# Patient Record
Sex: FEMALE | Race: WHITE | NOT HISPANIC OR LATINO | Employment: FULL TIME | URBAN - METROPOLITAN AREA
[De-identification: names, ages, dates, MRNs, and addresses within clinical notes are randomized per-mention and may not be internally consistent; named-entity substitution may affect disease eponyms.]

---

## 2023-03-14 ENCOUNTER — LAB (OUTPATIENT)
Dept: LAB | Facility: LAB | Age: 48
End: 2023-03-14
Payer: COMMERCIAL

## 2023-03-14 ENCOUNTER — TELEMEDICINE (OUTPATIENT)
Dept: PRIMARY CARE | Facility: CLINIC | Age: 48
End: 2023-03-14
Payer: COMMERCIAL

## 2023-03-14 DIAGNOSIS — J06.9 ACUTE URI: ICD-10-CM

## 2023-03-14 DIAGNOSIS — R05.1 ACUTE COUGH: ICD-10-CM

## 2023-03-14 DIAGNOSIS — R52 BODY ACHES: ICD-10-CM

## 2023-03-14 DIAGNOSIS — J06.9 ACUTE URI: Primary | ICD-10-CM

## 2023-03-14 PROBLEM — G43.909 MIGRAINES: Status: ACTIVE | Noted: 2023-03-14

## 2023-03-14 PROBLEM — M17.12 ARTHRITIS OF KNEE, LEFT: Status: ACTIVE | Noted: 2023-03-14

## 2023-03-14 PROBLEM — M54.9 BACK PAIN: Status: ACTIVE | Noted: 2023-03-14

## 2023-03-14 PROBLEM — M25.562 CHRONIC PAIN OF LEFT KNEE: Status: ACTIVE | Noted: 2023-03-14

## 2023-03-14 PROBLEM — R09.81 NASAL CONGESTION: Status: ACTIVE | Noted: 2023-03-14

## 2023-03-14 PROBLEM — G89.29 CHRONIC PAIN OF LEFT KNEE: Status: ACTIVE | Noted: 2023-03-14

## 2023-03-14 PROBLEM — N90.7 LABIAL CYST: Status: ACTIVE | Noted: 2023-03-14

## 2023-03-14 PROBLEM — E55.9 VITAMIN D DEFICIENCY: Status: ACTIVE | Noted: 2023-03-14

## 2023-03-14 PROBLEM — G47.00 INSOMNIA: Status: ACTIVE | Noted: 2023-03-14

## 2023-03-14 PROBLEM — G35 MULTIPLE SCLEROSIS (MULTI): Status: ACTIVE | Noted: 2023-03-14

## 2023-03-14 PROBLEM — M25.511 RIGHT SHOULDER PAIN: Status: ACTIVE | Noted: 2023-03-14

## 2023-03-14 PROBLEM — M25.562 LEFT KNEE PAIN: Status: ACTIVE | Noted: 2023-03-14

## 2023-03-14 PROBLEM — M75.41 IMPINGEMENT SYNDROME OF RIGHT SHOULDER: Status: ACTIVE | Noted: 2023-03-14

## 2023-03-14 PROBLEM — I10 BENIGN ESSENTIAL HYPERTENSION: Status: ACTIVE | Noted: 2023-03-14

## 2023-03-14 PROBLEM — B37.31 CANDIDA VAGINITIS: Status: ACTIVE | Noted: 2023-03-14

## 2023-03-14 PROBLEM — R10.9 ABDOMINAL PAIN: Status: ACTIVE | Noted: 2023-03-14

## 2023-03-14 PROBLEM — M22.40 CHONDROMALACIA OF PATELLA: Status: ACTIVE | Noted: 2023-03-14

## 2023-03-14 PROBLEM — N92.6 IRREGULAR MENSES: Status: ACTIVE | Noted: 2023-03-14

## 2023-03-14 PROBLEM — M25.519 AC JOINT PAIN: Status: ACTIVE | Noted: 2023-03-14

## 2023-03-14 PROCEDURE — U0005 INFEC AGEN DETEC AMPLI PROBE: HCPCS

## 2023-03-14 PROCEDURE — 99214 OFFICE O/P EST MOD 30 MIN: CPT | Performed by: STUDENT IN AN ORGANIZED HEALTH CARE EDUCATION/TRAINING PROGRAM

## 2023-03-14 PROCEDURE — 87651 STREP A DNA AMP PROBE: CPT

## 2023-03-14 PROCEDURE — 87636 SARSCOV2 & INF A&B AMP PRB: CPT

## 2023-03-14 RX ORDER — BENZONATATE 200 MG/1
200 CAPSULE ORAL 3 TIMES DAILY PRN
Qty: 42 CAPSULE | Refills: 0 | Status: SHIPPED | OUTPATIENT
Start: 2023-03-14 | End: 2023-04-13

## 2023-03-14 RX ORDER — LISINOPRIL 20 MG/1
20 TABLET ORAL DAILY
COMMUNITY
End: 2023-06-08 | Stop reason: SDUPTHER

## 2023-03-14 RX ORDER — ERGOCALCIFEROL 1.25 MG/1
CAPSULE ORAL
COMMUNITY
End: 2023-06-08 | Stop reason: SDUPTHER

## 2023-03-14 RX ORDER — DOXYCYCLINE 100 MG/1
100 CAPSULE ORAL 2 TIMES DAILY
Qty: 14 CAPSULE | Refills: 0 | Status: SHIPPED | OUTPATIENT
Start: 2023-03-14 | End: 2023-03-21

## 2023-03-14 RX ORDER — TRAZODONE HYDROCHLORIDE 150 MG/1
150 TABLET ORAL NIGHTLY
COMMUNITY
End: 2023-06-08 | Stop reason: SDUPTHER

## 2023-03-14 NOTE — PROGRESS NOTES
Subjective   Patient ID: Becky Mendiola is a 47 y.o. female who presents for URI.    HPI  Over the past 3 days she has been noticing an upper respiratory infection  Stated symptoms started as ear congestion, postnasal drip, body aches, coughing, runny nose, insomnia, and the overall feeling of being acutely ill    She states that her body aches are slightly worsening due to all the coughing that she is experiencing    She did test negative for COVID-19 at home and denies any sick contacts or any traveling    Asking to be further evaluated/treated as this occurs a few times a year for her in the past  Most recent was in the fall and she was treated appropriately with doxycycline    Review of Systems  All pertinent positive symptoms are included in the history of present illness.    All other systems have been reviewed and are negative and noncontributory to this patient's current ailments.    Objective   There were no vitals taken for this visit.    Physical Exam  CONSTITUTIONAL - well nourished, well developed, looks like stated age, in no acute distress, appears slightly fatigued and ill, sounds congested  SKIN - normal skin color and pigmentation, normal skin turgor without rash, lesions, or nodules visualized  HEAD - no trauma, normocephalic  EYES - normal external exam  CHEST -no distressed breathing, good effort, coughing occasionally during the examination  EXTREMITIES - no edema, no deformities  NEUROLOGICAL - normal balance, normal motor, no ataxia  PSYCHIATRIC - alert, pleasant and cordial, age-appropriate    Assessment/Plan   1.  Acute URI/cough/body aches    To be fully thorough I did order COVID-19, influenza A/B, and group A strep  Please come to the office to be swabbed at your earliest mutual convenience    Lastly, since this is very similar to your last illness, I did provide doxycycline be taken twice daily for the next 7 days alongside Tessalon Perles to help with your cough  If your cough and other  symptoms continue, we will discuss adding on a steroid pack    Otherwise, we will wait for the results and make recommendations accordingly based on the swabs that we will do today    At any point if you have worsening signs/symptoms or any difficulty breathing, you need to contact the office immediately and/or go to the nearest emergency room to be acutely evaluated

## 2023-03-15 ENCOUNTER — TELEPHONE (OUTPATIENT)
Dept: PRIMARY CARE | Facility: CLINIC | Age: 48
End: 2023-03-15
Payer: COMMERCIAL

## 2023-03-15 DIAGNOSIS — J06.9 ACUTE URI: Primary | ICD-10-CM

## 2023-03-15 LAB
FLU A RESULT: NOT DETECTED
FLU B RESULT: NOT DETECTED
GROUP A STREP, PCR: NOT DETECTED
SARS-COV-2 RESULT: NOT DETECTED

## 2023-03-15 RX ORDER — ALBUTEROL SULFATE 90 UG/1
2 AEROSOL, METERED RESPIRATORY (INHALATION) EVERY 4 HOURS PRN
Qty: 8.5 G | Refills: 1 | Status: SHIPPED | OUTPATIENT
Start: 2023-03-15 | End: 2023-12-08 | Stop reason: SDUPTHER

## 2023-03-15 NOTE — PROGRESS NOTES
Sent into the pharmacy and albuterol inhaler for the patient's acute URI    This was sent via another message

## 2023-03-15 NOTE — TELEPHONE ENCOUNTER
----- Message from Kasi Childers,  sent at 3/15/2023 11:33 AM EDT -----  Group A strep negative alongside influenza A/B as well as COVID-19    I would recommend that she continues to take the doxycycline that was prescribed    Please follow-up appropriately if this continues to be an issue

## 2023-03-15 NOTE — RESULT ENCOUNTER NOTE
Group A strep negative alongside influenza A/B as well as COVID-19    I would recommend that she continues to take the doxycycline that was prescribed    Please follow-up appropriately if this continues to be an issue

## 2023-03-17 ENCOUNTER — PATIENT MESSAGE (OUTPATIENT)
Dept: PRIMARY CARE | Facility: CLINIC | Age: 48
End: 2023-03-17
Payer: COMMERCIAL

## 2023-03-17 DIAGNOSIS — J06.9 ACUTE URI: Primary | ICD-10-CM

## 2023-03-17 RX ORDER — METHYLPREDNISOLONE 4 MG/1
TABLET ORAL
Qty: 21 TABLET | Refills: 0 | Status: SHIPPED | OUTPATIENT
Start: 2023-03-17 | End: 2023-03-24

## 2023-03-20 ENCOUNTER — TELEPHONE (OUTPATIENT)
Dept: PRIMARY CARE | Facility: CLINIC | Age: 48
End: 2023-03-20
Payer: COMMERCIAL

## 2023-03-20 DIAGNOSIS — J06.9 ACUTE URI: Primary | ICD-10-CM

## 2023-03-20 DIAGNOSIS — R05.1 ACUTE COUGH: ICD-10-CM

## 2023-03-20 DIAGNOSIS — R52 BODY ACHES: ICD-10-CM

## 2023-03-20 RX ORDER — AZITHROMYCIN 250 MG/1
TABLET, FILM COATED ORAL
Qty: 6 TABLET | Refills: 0 | Status: SHIPPED | OUTPATIENT
Start: 2023-03-20 | End: 2023-03-25

## 2023-03-20 RX ORDER — CODEINE PHOSPHATE AND GUAIFENESIN 10; 100 MG/5ML; MG/5ML
5 SOLUTION ORAL 2 TIMES DAILY
Qty: 50 ML | Refills: 0 | Status: SHIPPED | OUTPATIENT
Start: 2023-03-20 | End: 2023-06-08 | Stop reason: ALTCHOICE

## 2023-03-20 NOTE — TELEPHONE ENCOUNTER
Pt feels antibiotics aren't helping her cough. Pt is still coughing constantly and feels it has moved into her chest as she is having some wheezing. She is using the inhaler but feels she needs something else to help suppress these symptoms. Also still has sinus congestion.

## 2023-03-20 NOTE — TELEPHONE ENCOUNTER
Regarding: FW: Not feeling better  Contact: [phone]  Unfortunately, I am not too sure where else to go    There is another medication but it is much stronger and I never prescribe it    I would just recommend then that she just changes to the azithromycin that was prescribed    Also, did she finish all of her steroids?  ----- Message -----  From: Susan Ravi MA  Sent: 3/20/2023   4:12 PM EDT  To: Kasi Childers DO  Subject: FW: Not feeling better                           Spoke w/[facility], they do not carry Codeine products at all and spoke w/[facility] who is also no longer carrying this. Is there an alternative we can send for her?  ----- Message -----  From: Kasi Childers DO  Sent: 3/20/2023   3:59 PM EDT  To: Susan Ravi MA  Subject: FW: Not feeling better                           Please call the pharmacy to see if this is truly the case    If it is, please let me know    If anything, we can easily try to send to [facility] here in [place] if we have to    Please let me know    Thank you  ----- Message -----  From: Ssuan Ravi MA  Sent: 3/20/2023   2:51 PM EDT  To: Kasi Childers DO  Subject: FW: Not feeling better                             ----- Message -----  From: Becky Mendiola  Sent: 3/20/2023   2:47 PM EDT  To: #  Subject: Not feeling better                               Juan Carlos Davis, I just talked to [facility] and they cancelled the cough medicine with Codeine that you sent over. They said they don't fill those prescriptions any longer because people were sending in fake scripts? Do you know of any other pharmacy's that would take that, or something similar that can be prescribed? I plan on going to the [facility] in [place] in the morning to get my x-ray. [facility] [place]?

## 2023-03-22 ENCOUNTER — TELEPHONE (OUTPATIENT)
Dept: PRIMARY CARE | Facility: CLINIC | Age: 48
End: 2023-03-22
Payer: COMMERCIAL

## 2023-03-22 NOTE — TELEPHONE ENCOUNTER
----- Message from Kasi Childers DO sent at 3/22/2023 11:26 AM EDT -----  Chest x-ray was well within normal limits without any acute findings    No pneumonia is noted    I recommend that she continue the current medication regiment that was prescribed

## 2023-03-22 NOTE — RESULT ENCOUNTER NOTE
Chest x-ray was well within normal limits without any acute findings    No pneumonia is noted    I recommend that she continue the current medication regiment that was prescribed

## 2023-06-06 ASSESSMENT — PROMIS GLOBAL HEALTH SCALE
CARRYOUT_SOCIAL_ACTIVITIES: VERY GOOD
RATE_SOCIAL_SATISFACTION: VERY GOOD
RATE_PHYSICAL_HEALTH: VERY GOOD
RATE_AVERAGE_FATIGUE: MODERATE
RATE_AVERAGE_PAIN: 5
EMOTIONAL_PROBLEMS: RARELY
RATE_QUALITY_OF_LIFE: VERY GOOD
CARRYOUT_PHYSICAL_ACTIVITIES: COMPLETELY
RATE_MENTAL_HEALTH: EXCELLENT
RATE_GENERAL_HEALTH: VERY GOOD

## 2023-06-08 ENCOUNTER — OFFICE VISIT (OUTPATIENT)
Dept: PRIMARY CARE | Facility: CLINIC | Age: 48
End: 2023-06-08
Payer: COMMERCIAL

## 2023-06-08 VITALS
SYSTOLIC BLOOD PRESSURE: 122 MMHG | WEIGHT: 204 LBS | HEIGHT: 67 IN | BODY MASS INDEX: 32.02 KG/M2 | DIASTOLIC BLOOD PRESSURE: 80 MMHG | HEART RATE: 87 BPM

## 2023-06-08 DIAGNOSIS — E55.9 VITAMIN D DEFICIENCY: ICD-10-CM

## 2023-06-08 DIAGNOSIS — I10 PRIMARY HYPERTENSION: ICD-10-CM

## 2023-06-08 DIAGNOSIS — G43.109 MIGRAINE WITH AURA AND WITHOUT STATUS MIGRAINOSUS, NOT INTRACTABLE: ICD-10-CM

## 2023-06-08 DIAGNOSIS — G47.00 INSOMNIA, UNSPECIFIED TYPE: ICD-10-CM

## 2023-06-08 DIAGNOSIS — Z00.00 HEALTH MAINTENANCE EXAMINATION: Primary | ICD-10-CM

## 2023-06-08 DIAGNOSIS — G35 MULTIPLE SCLEROSIS (MULTI): ICD-10-CM

## 2023-06-08 DIAGNOSIS — Z12.31 ENCOUNTER FOR SCREENING MAMMOGRAM FOR MALIGNANT NEOPLASM OF BREAST: ICD-10-CM

## 2023-06-08 LAB
POC APPEARANCE, URINE: CLEAR
POC BILIRUBIN, URINE: NEGATIVE
POC BLOOD, URINE: NEGATIVE
POC COLOR, URINE: NORMAL
POC GLUCOSE, URINE: NEGATIVE MG/DL
POC KETONES, URINE: NEGATIVE MG/DL
POC LEUKOCYTES, URINE: NEGATIVE
POC NITRITE,URINE: NEGATIVE
POC PH, URINE: 6 PH
POC PROTEIN, URINE: NEGATIVE MG/DL
POC SPECIFIC GRAVITY, URINE: 1.02
POC UROBILINOGEN, URINE: 0.2 EU/DL

## 2023-06-08 PROCEDURE — 3074F SYST BP LT 130 MM HG: CPT | Performed by: STUDENT IN AN ORGANIZED HEALTH CARE EDUCATION/TRAINING PROGRAM

## 2023-06-08 PROCEDURE — 3079F DIAST BP 80-89 MM HG: CPT | Performed by: STUDENT IN AN ORGANIZED HEALTH CARE EDUCATION/TRAINING PROGRAM

## 2023-06-08 PROCEDURE — 99213 OFFICE O/P EST LOW 20 MIN: CPT | Performed by: STUDENT IN AN ORGANIZED HEALTH CARE EDUCATION/TRAINING PROGRAM

## 2023-06-08 PROCEDURE — 81002 URINALYSIS NONAUTO W/O SCOPE: CPT | Performed by: STUDENT IN AN ORGANIZED HEALTH CARE EDUCATION/TRAINING PROGRAM

## 2023-06-08 PROCEDURE — 1036F TOBACCO NON-USER: CPT | Performed by: STUDENT IN AN ORGANIZED HEALTH CARE EDUCATION/TRAINING PROGRAM

## 2023-06-08 PROCEDURE — 99396 PREV VISIT EST AGE 40-64: CPT | Performed by: STUDENT IN AN ORGANIZED HEALTH CARE EDUCATION/TRAINING PROGRAM

## 2023-06-08 RX ORDER — LISINOPRIL 20 MG/1
20 TABLET ORAL DAILY
Qty: 90 TABLET | Refills: 1 | Status: SHIPPED | OUTPATIENT
Start: 2023-06-08 | End: 2023-11-28

## 2023-06-08 RX ORDER — ERGOCALCIFEROL 1.25 MG/1
1 CAPSULE ORAL
Qty: 24 CAPSULE | Refills: 0 | Status: SHIPPED | OUTPATIENT
Start: 2023-06-08 | End: 2023-10-30

## 2023-06-08 RX ORDER — TRAZODONE HYDROCHLORIDE 150 MG/1
150 TABLET ORAL NIGHTLY
Qty: 90 TABLET | Refills: 1 | Status: SHIPPED | OUTPATIENT
Start: 2023-06-08 | End: 2023-11-28

## 2023-06-08 NOTE — PROGRESS NOTES
Subjective   Patient ID: Becky Mendiola is a 47 y.o. female who presents for Annual Exam.  Today she is accompanied by alone.     HPI    1. Health maintenance  Overall she feels well and does not have major complaints other than what is discussed below  Immunizations: up to date.  Breast Cancer Screening: Mammogram performed in March 2022, Birads 1.  Has not seen her OB/GYN since 2015, stated that she will do so in the near future  Cervical Cancer ScreningL Pap/HPV performed in 2015 negative.  Colon Cancer Screening: Cologuard performed in March 2022, negative.  Denies any chest pain, palpitations, shortness of breath, cough, nausea, vomiting, diarrhea, or any other acute signs/symptoms  Diet: well balanced  Exercise: Trying to increase.     2. Benign essential hypertension  Continues to take lisinopril 20 mg daily as indicated in the chart  Blood pressure today 122/80.  Denies any cardiopulmonary symptoms  Requesting refills     3. Insomnia  Feels well with the trazodone 150 mg at bedtime.  She is well rested in the morning. Desires to continue at current dose.  Requesting refills to be sent to her local pharmacy     4. Vitamin D deficiency  Continues to take vitamin D 50,000 International Units weekly.  Asking to get this rechecked    5. Migraines  In last two months, she had her migraines return that she had previously.  She will develop some eye pain, then her migraine develops later in the day.  Admits to nausea and vomiting, sensitivity to sound and light.  Her migraines last for 2-3 days.  Denies numbness or tingling, dysphagia, weakness, loss of eye sight.  Previously tried triptans, bet always felt sick, desires to try something else.    Current Outpatient Medications on File Prior to Visit   Medication Sig Dispense Refill    albuterol (ProAir HFA) 90 mcg/actuation inhaler Inhale 2 puffs every 4 hours if needed for wheezing or shortness of breath. 8.5 g 1    [DISCONTINUED] codeine-guaifenesin (guaiFENesin  "AC)  mg/5 mL syrup Take 5 mL by mouth in the morning and 5 mL before bedtime. As needed for cough. 50 mL 0    [DISCONTINUED] ergocalciferol (Vitamin D-2) 1.25 MG (44675 UT) capsule take 1 capsule by mouth one time per week      [DISCONTINUED] lisinopril 20 mg tablet Take 1 tablet (20 mg) by mouth once daily.      [DISCONTINUED] traZODone (Desyrel) 150 mg tablet Take 1 tablet (150 mg) by mouth once daily at bedtime.       No current facility-administered medications on file prior to visit.        Allergies   Allergen Reactions    Amoxicillin Unknown    Sulfa (Sulfonamide Antibiotics) Unknown       Immunization History   Administered Date(s) Administered    Influenza, seasonal, injectable 10/12/2020    Tdap 08/01/2017, 01/10/2023         Review of Systems  All pertinent positive symptoms are included in the history of present illness.  All other systems have been reviewed and are negative and noncontributory to this patient's current ailments.     Objective   /80 (BP Location: Left arm, Patient Position: Sitting, BP Cuff Size: Adult)   Pulse 87   Ht 1.702 m (5' 7\")   Wt 92.5 kg (204 lb)   BMI 31.95 kg/m²   BSA: 2.09 meters squared  No visits with results within 1 Month(s) from this visit.   Latest known visit with results is:   Lab on 03/14/2023   Component Date Value Ref Range Status    Flu A Result 03/14/2023 NOT DETECTED  Not Detected Final     Respiratory virus testing is performed routinely by PCR    for Influenza A/B and RSV. If Influenza and RSV PCR are    negative, testing for parainfluenza 1,2,3 viruses and    adenovirus is routinely performed for oncology inpatients    and intensive care unit patients at [facility] and is available   on request on other patients by calling Laboratory Client   Services at [phone].   Not Detected results do not preclude Influenza A/B or RSV   infections since the adequacy of sample collection or low   viral burden may impact the clinical sensitivity of this   " test method.    Flu B Result 03/14/2023 NOT DETECTED  Not Detected Final     Respiratory virus testing is performed routinely by PCR    for Influenza A/B and RSV. If Influenza and RSV PCR are    negative, testing for parainfluenza 1,2,3 viruses and    adenovirus is routinely performed for oncology inpatients    and intensive care unit patients at [facility] and is available   on request on other patients by calling Laboratory Client   Services at [phone]   Not Detected results do not preclude Influenza A/B or RSV   infections since the adequacy of sample collection or low   viral burden may impact the clinical sensitivity of this   test method.    SARS-COV-2 RESULT 03/14/2023 NOT DETECTED  Not Detected Final    Comment: .  This assay is designed to detect the ORF1a/b and E genes of SARS-CoV-2 via   nucleic acid amplification. A Not Detected result does not preclude   2019-nCoV infection since the adequacy of sample collection and/or low viral   burden may result in presence of viral nucleic acids below the clinical   sensitivity of this test method.     Fact sheet for providers: https://www.fda.gov/media/739741/download   Fact sheet for patients: https://www.fda.gov/media/157425/download     This test has received FDA Emergency Use Authorization (EUA) and has been   verified for use by [facility] ([facility]).   This test is only authorized for the duration of time that circumstances   exist to justify the authorization of the emergency use of in vitro   diagnostic tests for the detection of SARS-CoV-2 virus and/or diagnosis of   COVID-19 infection under section 564(b)(1) of the Act, 21 U.S.C.   360bbb-3(b)(1), unless the authorization is terminated or revoked                            sooner.    [facility] is certified under CLIA-88 as   qualified to perform high complexity testing. Testing is performed in the   [facility] laboratories located at [id number]  [place] [address].    Group A Strep PCR 03/14/2023 NOT DETECTED  Not Detected Final     This test and its performance have been Validated by [facility]    Laboratory  using analyte specific reagents (ASR). It has   not been cleared or approved by the U.S. Food and Drug   Administration. The FDA has determined that such clearance   or approval is not necessary.       Physical Exam  CONSTITUTIONAL - well nourished, well developed, looks like stated age, in no acute distress, not ill-appearing, and not tired appearing  SKIN - normal skin color and pigmentation, normal skin turgor without rash, lesions, or nodules visualized  HEAD - no trauma, normocephalic  EYES - pupils are equal and reactive to light, extraocular muscles are intact, and normal external exam  ENT - TM's intact, no injection, no signs of infection, uvula midline, normal tongue movement and throat normal, no exudate, nasal passage without discharge and patent  NECK - supple without rigidity, no neck mass was observed, no thyromegaly or thyroid nodules  CHEST - clear to auscultation, no wheezing, no crackles and no rales, good effort  CARDIAC - regular rate and regular rhythm, no skipped beats, no murmur  ABDOMEN - no organomegaly, soft, nontender, nondistended, normal bowel sounds, no guarding/rebound/rigidity, negative McBurney sign and negative Carroll sign  NEUROLOGICAL - normal gait, normal balance, normal motor, no ataxia, DTRs equal and symmetrical; alert, oriented and no focal signs  PSYCHIATRIC - alert, pleasant and cordial, age-appropriate  IMMUNOLOGIC - no cervical lymphadenopathy     Assessment/Plan   1. Health maintenance  Complete history and physical examination was performed  EKG declined per patient.  We will notify of test results once available and make treatment recommendations accordingly  Order placed for mammogram with tomosynthesis.  Recommend to establish with a gynecologist to update your cervical cancer screening.      2. Benign essential hypertension  Stable, continue lisinopril 20 mg daily.  Blood pressure goal should be below 130/80, ideally 120/80  Please check your blood pressure at home and keep a log.  Please bring this log with you to your next appointment     3. Insomnia  Stable, continue trazodone 150 mg at night.     4. Vitamin D deficiency  We will notify you of the results and make recommendations accordingly.    5. Migraines  Samples provided for Demond and Nurtec for a trial.  If you find one works better for you over the other we can send in a prescription for the medication.    6.  MS  Has a history of MS and used to see a neurologist 5+ years ago  Currently feels fine and asymptomatic  We will continue monitoring closely    If you have any concerns or questions please call the office.    Follow up in 6 months for medication check.

## 2023-10-29 DIAGNOSIS — E55.9 VITAMIN D DEFICIENCY: ICD-10-CM

## 2023-10-30 RX ORDER — ERGOCALCIFEROL 1.25 MG/1
1 CAPSULE ORAL
Qty: 24 CAPSULE | Refills: 0 | Status: SHIPPED | OUTPATIENT
Start: 2023-10-30 | End: 2024-04-22

## 2023-11-01 ENCOUNTER — OFFICE VISIT (OUTPATIENT)
Dept: PRIMARY CARE | Facility: CLINIC | Age: 48
End: 2023-11-01
Payer: COMMERCIAL

## 2023-11-01 VITALS
DIASTOLIC BLOOD PRESSURE: 80 MMHG | BODY MASS INDEX: 32.3 KG/M2 | SYSTOLIC BLOOD PRESSURE: 122 MMHG | WEIGHT: 205.8 LBS | HEART RATE: 82 BPM | HEIGHT: 67 IN

## 2023-11-01 DIAGNOSIS — G89.29 CHRONIC PAIN OF LEFT KNEE: Primary | ICD-10-CM

## 2023-11-01 DIAGNOSIS — G43.109 MIGRAINE WITH AURA AND WITHOUT STATUS MIGRAINOSUS, NOT INTRACTABLE: ICD-10-CM

## 2023-11-01 DIAGNOSIS — G35 MULTIPLE SCLEROSIS (MULTI): ICD-10-CM

## 2023-11-01 DIAGNOSIS — M25.562 CHRONIC PAIN OF LEFT KNEE: Primary | ICD-10-CM

## 2023-11-01 PROCEDURE — 3074F SYST BP LT 130 MM HG: CPT | Performed by: STUDENT IN AN ORGANIZED HEALTH CARE EDUCATION/TRAINING PROGRAM

## 2023-11-01 PROCEDURE — 3079F DIAST BP 80-89 MM HG: CPT | Performed by: STUDENT IN AN ORGANIZED HEALTH CARE EDUCATION/TRAINING PROGRAM

## 2023-11-01 PROCEDURE — 99214 OFFICE O/P EST MOD 30 MIN: CPT | Performed by: STUDENT IN AN ORGANIZED HEALTH CARE EDUCATION/TRAINING PROGRAM

## 2023-11-01 PROCEDURE — 1036F TOBACCO NON-USER: CPT | Performed by: STUDENT IN AN ORGANIZED HEALTH CARE EDUCATION/TRAINING PROGRAM

## 2023-11-01 RX ORDER — NAPROXEN 500 MG/1
500 TABLET ORAL 2 TIMES DAILY PRN
Qty: 60 TABLET | Refills: 2 | Status: SHIPPED | OUTPATIENT
Start: 2023-11-01 | End: 2024-04-23

## 2023-11-01 ASSESSMENT — PATIENT HEALTH QUESTIONNAIRE - PHQ9
SUM OF ALL RESPONSES TO PHQ9 QUESTIONS 1 AND 2: 0
2. FEELING DOWN, DEPRESSED OR HOPELESS: NOT AT ALL
1. LITTLE INTEREST OR PLEASURE IN DOING THINGS: NOT AT ALL

## 2023-11-01 NOTE — PROGRESS NOTES
"Subjective   Patient ID: Becky Mendiola is a 48 y.o. female who presents for Left Knee Pain.    HPI   Patient is presenting to the office today for left-sided knee pain as well as to follow-up with her migraines    1.  Left knee pain  Patient has a chronic history of left-sided knee pain as well as arthritis  She even followed up with an orthopedic provider, Dr. Berman, back in 2020  She is status post left knee scope lateral release on 6/8/2020  Recently just traveled to the [facility] and ultimately has had worsening signs/symptoms  Try to do home PT but unfortunately not helping  Feels like this is like a rubber band sometimes is restricting motion  Wish to discuss this further    2.  Migraines  She has been using the Ubrelvy and stated that this works fantastically  Willing and wishing to continue this medication on as-needed basis  Requesting refills    Review of Systems  All pertinent positive symptoms are included in the history of present illness.    All other systems have been reviewed and are negative and noncontributory to this patient's current ailments.  Objective   /80 (BP Location: Left arm, Patient Position: Sitting, BP Cuff Size: Adult)   Pulse 82   Ht 1.702 m (5' 7\")   Wt 93.4 kg (205 lb 12.8 oz)   BMI 32.23 kg/m²     Physical Exam  CONSTITUTIONAL - well nourished, well developed, looks like stated age, in no acute distress, not ill-appearing, and not tired appearing  SKIN - normal skin color and pigmentation, normal skin turgor without rash, lesions, or nodules visualized  HEAD - no trauma, normocephalic  EYES - normal external exam  CHEST -no distressed breathing, good effort  EXTREMITIES - no edema, no deformities, knee examination within normal limits other than noted arthritis upon palpation and some minor discomfort on motion, negative Lachman, negative Yunier  NEUROLOGICAL - normal balance, normal motor, no ataxia  PSYCHIATRIC - alert, pleasant and cordial, " age-appropriate    Assessment/Plan   1.  Left knee pain  We had a long conversation about your signs/symptoms  I did order an MRI to see if we get this covered due to you having another MRI being done in 2019 and also having surgery at that time shortly thereafter  If we cannot get this covered through your insurance, I would then recommend we look for a cash price location such as [facility] or even [facility]  To help with the pain and inflammation I did send naproxen to use on an as-needed basis  Keep me in the loop on how you are doing and updated with this MRI  We will notify the results and make recommendations accordingly when these come through and we may need to get you back to an orthopedic provider    2.  Migraines  Refills of the Ubrelvy were sent to your local pharmacy  Please continue keeping us in the loop on how you are feeling  Stable without any changes

## 2023-11-17 ENCOUNTER — HOSPITAL ENCOUNTER (OUTPATIENT)
Dept: RADIOLOGY | Facility: CLINIC | Age: 48
Discharge: HOME | End: 2023-11-17
Payer: COMMERCIAL

## 2023-11-17 DIAGNOSIS — G89.29 CHRONIC PAIN OF LEFT KNEE: ICD-10-CM

## 2023-11-17 DIAGNOSIS — M25.562 CHRONIC PAIN OF LEFT KNEE: ICD-10-CM

## 2023-11-17 PROCEDURE — 73721 MRI JNT OF LWR EXTRE W/O DYE: CPT | Mod: LT

## 2023-11-17 PROCEDURE — 73721 MRI JNT OF LWR EXTRE W/O DYE: CPT | Mod: LEFT SIDE | Performed by: RADIOLOGY

## 2023-11-17 NOTE — RESULT ENCOUNTER NOTE
MRI of the knee shows severe patellofemoral compartment cartilage loss as well as mild cartilage loss in the tibiofemoral compartments    No meniscal or any ligamental tears are seen    There is also a cyst that has been stable from the prior visit    If this continues to be an issue, the next step would be following up with a orthopedic provider
20

## 2023-11-20 DIAGNOSIS — M25.562 LEFT KNEE PAIN, UNSPECIFIED CHRONICITY: ICD-10-CM

## 2023-11-22 ENCOUNTER — OFFICE VISIT (OUTPATIENT)
Dept: PRIMARY CARE | Facility: CLINIC | Age: 48
End: 2023-11-22
Payer: COMMERCIAL

## 2023-11-22 VITALS — DIASTOLIC BLOOD PRESSURE: 78 MMHG | HEART RATE: 88 BPM | SYSTOLIC BLOOD PRESSURE: 130 MMHG

## 2023-11-22 DIAGNOSIS — R31.9 URINARY TRACT INFECTION WITH HEMATURIA, SITE UNSPECIFIED: Primary | ICD-10-CM

## 2023-11-22 DIAGNOSIS — G47.00 INSOMNIA, UNSPECIFIED TYPE: ICD-10-CM

## 2023-11-22 DIAGNOSIS — T36.95XA ANTIBIOTIC-INDUCED YEAST INFECTION: ICD-10-CM

## 2023-11-22 DIAGNOSIS — I10 PRIMARY HYPERTENSION: ICD-10-CM

## 2023-11-22 DIAGNOSIS — B37.9 ANTIBIOTIC-INDUCED YEAST INFECTION: ICD-10-CM

## 2023-11-22 DIAGNOSIS — N39.0 URINARY TRACT INFECTION WITH HEMATURIA, SITE UNSPECIFIED: Primary | ICD-10-CM

## 2023-11-22 LAB
APPEARANCE UR: ABNORMAL
BILIRUB UR STRIP.AUTO-MCNC: NEGATIVE MG/DL
COLOR UR: YELLOW
GLUCOSE UR STRIP.AUTO-MCNC: NEGATIVE MG/DL
KETONES UR STRIP.AUTO-MCNC: NEGATIVE MG/DL
LEUKOCYTE ESTERASE UR QL STRIP.AUTO: NEGATIVE
NITRITE UR QL STRIP.AUTO: NEGATIVE
PH UR STRIP.AUTO: 5 [PH]
POC APPEARANCE, URINE: CLEAR
POC BILIRUBIN, URINE: NEGATIVE
POC BLOOD, URINE: ABNORMAL
POC COLOR, URINE: ABNORMAL
POC GLUCOSE, URINE: NEGATIVE MG/DL
POC KETONES, URINE: NEGATIVE MG/DL
POC LEUKOCYTES, URINE: NEGATIVE
POC NITRITE,URINE: NEGATIVE
POC PH, URINE: 6 PH
POC PROTEIN, URINE: NEGATIVE MG/DL
POC SPECIFIC GRAVITY, URINE: 1.02
POC UROBILINOGEN, URINE: 0.2 EU/DL
PROT UR STRIP.AUTO-MCNC: NEGATIVE MG/DL
RBC # UR STRIP.AUTO: NEGATIVE /UL
SP GR UR STRIP.AUTO: 1.02
UROBILINOGEN UR STRIP.AUTO-MCNC: <2 MG/DL

## 2023-11-22 PROCEDURE — 1036F TOBACCO NON-USER: CPT | Performed by: STUDENT IN AN ORGANIZED HEALTH CARE EDUCATION/TRAINING PROGRAM

## 2023-11-22 PROCEDURE — 87086 URINE CULTURE/COLONY COUNT: CPT

## 2023-11-22 PROCEDURE — 99213 OFFICE O/P EST LOW 20 MIN: CPT | Performed by: STUDENT IN AN ORGANIZED HEALTH CARE EDUCATION/TRAINING PROGRAM

## 2023-11-22 PROCEDURE — 3075F SYST BP GE 130 - 139MM HG: CPT | Performed by: STUDENT IN AN ORGANIZED HEALTH CARE EDUCATION/TRAINING PROGRAM

## 2023-11-22 PROCEDURE — 81002 URINALYSIS NONAUTO W/O SCOPE: CPT | Performed by: STUDENT IN AN ORGANIZED HEALTH CARE EDUCATION/TRAINING PROGRAM

## 2023-11-22 PROCEDURE — 3078F DIAST BP <80 MM HG: CPT | Performed by: STUDENT IN AN ORGANIZED HEALTH CARE EDUCATION/TRAINING PROGRAM

## 2023-11-22 PROCEDURE — 81003 URINALYSIS AUTO W/O SCOPE: CPT

## 2023-11-22 RX ORDER — FLUCONAZOLE 150 MG/1
TABLET ORAL
Qty: 2 TABLET | Refills: 0 | Status: SHIPPED | OUTPATIENT
Start: 2023-11-22 | End: 2023-12-08 | Stop reason: ALTCHOICE

## 2023-11-22 RX ORDER — NITROFURANTOIN 25; 75 MG/1; MG/1
100 CAPSULE ORAL 2 TIMES DAILY
Qty: 14 CAPSULE | Refills: 0 | Status: SHIPPED | OUTPATIENT
Start: 2023-11-22 | End: 2023-11-29

## 2023-11-22 ASSESSMENT — ENCOUNTER SYMPTOMS
FLANK PAIN: 0
PALPITATIONS: 0
NAUSEA: 0
CHILLS: 0
FREQUENCY: 1
VOMITING: 0
DYSURIA: 1
BACK PAIN: 0
HEMATURIA: 1
SHORTNESS OF BREATH: 0
APNEA: 0
ABDOMINAL PAIN: 0
FEVER: 0

## 2023-11-22 NOTE — PROGRESS NOTES
Subjective   Patient ID: Becky Mendiola is a 48 y.o. female who presents for UTI.    UTI   This is a recurrent problem. The current episode started yesterday. The problem has been gradually worsening. The quality of the pain is described as burning. The pain is moderate. There has been no fever. She is Sexually active. There is No history of pyelonephritis. Associated symptoms include frequency, hematuria and urgency. Pertinent negatives include no chills, discharge, flank pain, nausea or vomiting. She has tried increased fluids for the symptoms. The treatment provided no relief. Her past medical history is significant for recurrent UTIs. There is no history of kidney stones or a urological procedure.      Patient states she usually gets UTIs twice a year, last UTI was in May 2023 where she was treated with Macrobid.  Also states that patient is prone to yeast infections with any antibiotic use.    Review of Systems   Constitutional:  Negative for chills and fever.   Respiratory:  Negative for apnea and shortness of breath.    Cardiovascular:  Negative for chest pain and palpitations.   Gastrointestinal:  Negative for abdominal pain, nausea and vomiting.   Genitourinary:  Positive for dysuria, frequency, hematuria and urgency. Negative for flank pain.   Musculoskeletal:  Negative for back pain.       Objective   /78 (BP Location: Right arm, Patient Position: Sitting, BP Cuff Size: Adult)   Pulse 88     Physical Exam  Constitutional:       Appearance: Normal appearance. She is normal weight.   HENT:      Head: Normocephalic and atraumatic.      Mouth/Throat:      Mouth: Mucous membranes are moist.      Pharynx: Oropharynx is clear.   Cardiovascular:      Rate and Rhythm: Normal rate and regular rhythm.      Heart sounds: Normal heart sounds.   Pulmonary:      Effort: Pulmonary effort is normal.      Breath sounds: Normal breath sounds.   Abdominal:      General: Abdomen is flat. Bowel sounds are normal.       Palpations: Abdomen is soft.   Musculoskeletal:      Comments: No flank pain   Neurological:      General: No focal deficit present.      Mental Status: She is alert. Mental status is at baseline.   Psychiatric:         Mood and Affect: Mood normal.         Behavior: Behavior normal.         Assessment/Plan     Please take Macrobid 100 mg twice daily for 7 days, we will send a prescription for Diflucan for your antibiotic associated yeast infection. If you still have symptoms please take another diflucan in 3 days.   We will also do a urinalysis and urine culture     Risks, benefits, and options of medication(s) above were discussed with instructions on the medication usage for your infection  I encouraged supportive care such as rest, fluids and Advil/Tylenol as warranted  Urine was sent for culture to ensure proper medication was prescribed for your particular type of infection  Recheck urinalysis in 10 to 14 days to ensure complete resolution (no need to see physician at that time)  RTC in 3-5 days or sooner if symptoms worsen or persis

## 2023-11-24 LAB — BACTERIA UR CULT: NORMAL

## 2023-11-24 NOTE — RESULT ENCOUNTER NOTE
Urinalysis only shows a hazy appearance but everything else is negative    We are just waiting for the urine culture at this time

## 2023-11-26 NOTE — RESULT ENCOUNTER NOTE
Urine culture shows no significant growth    I would recommend the patient only takes 5 days of her antibiotic and if this continues to be an issue need to follow-up appropriately with either a urologist or even her OB/GYN

## 2023-11-28 RX ORDER — LISINOPRIL 20 MG/1
20 TABLET ORAL DAILY
Qty: 30 TABLET | Refills: 0 | Status: SHIPPED | OUTPATIENT
Start: 2023-11-28 | End: 2023-12-08 | Stop reason: SDUPTHER

## 2023-11-28 RX ORDER — TRAZODONE HYDROCHLORIDE 150 MG/1
150 TABLET ORAL NIGHTLY
Qty: 30 TABLET | Refills: 0 | Status: SHIPPED | OUTPATIENT
Start: 2023-11-28 | End: 2023-12-08 | Stop reason: SDUPTHER

## 2023-12-05 ENCOUNTER — APPOINTMENT (OUTPATIENT)
Dept: PRIMARY CARE | Facility: CLINIC | Age: 48
End: 2023-12-05
Payer: COMMERCIAL

## 2023-12-06 ENCOUNTER — APPOINTMENT (OUTPATIENT)
Dept: ORTHOPEDIC SURGERY | Facility: CLINIC | Age: 48
End: 2023-12-06
Payer: COMMERCIAL

## 2023-12-08 ENCOUNTER — OFFICE VISIT (OUTPATIENT)
Dept: PRIMARY CARE | Facility: CLINIC | Age: 48
End: 2023-12-08
Payer: COMMERCIAL

## 2023-12-08 VITALS
BODY MASS INDEX: 32.04 KG/M2 | SYSTOLIC BLOOD PRESSURE: 120 MMHG | WEIGHT: 204.6 LBS | HEART RATE: 102 BPM | DIASTOLIC BLOOD PRESSURE: 70 MMHG

## 2023-12-08 DIAGNOSIS — E55.9 VITAMIN D DEFICIENCY: ICD-10-CM

## 2023-12-08 DIAGNOSIS — G35 MULTIPLE SCLEROSIS (MULTI): Primary | ICD-10-CM

## 2023-12-08 DIAGNOSIS — G47.00 INSOMNIA, UNSPECIFIED TYPE: ICD-10-CM

## 2023-12-08 DIAGNOSIS — G43.109 MIGRAINE WITH AURA AND WITHOUT STATUS MIGRAINOSUS, NOT INTRACTABLE: ICD-10-CM

## 2023-12-08 DIAGNOSIS — J06.9 ACUTE URI: ICD-10-CM

## 2023-12-08 DIAGNOSIS — I10 PRIMARY HYPERTENSION: ICD-10-CM

## 2023-12-08 PROCEDURE — 99214 OFFICE O/P EST MOD 30 MIN: CPT | Performed by: STUDENT IN AN ORGANIZED HEALTH CARE EDUCATION/TRAINING PROGRAM

## 2023-12-08 PROCEDURE — 3078F DIAST BP <80 MM HG: CPT | Performed by: STUDENT IN AN ORGANIZED HEALTH CARE EDUCATION/TRAINING PROGRAM

## 2023-12-08 PROCEDURE — 1036F TOBACCO NON-USER: CPT | Performed by: STUDENT IN AN ORGANIZED HEALTH CARE EDUCATION/TRAINING PROGRAM

## 2023-12-08 PROCEDURE — 3074F SYST BP LT 130 MM HG: CPT | Performed by: STUDENT IN AN ORGANIZED HEALTH CARE EDUCATION/TRAINING PROGRAM

## 2023-12-08 RX ORDER — AZITHROMYCIN 250 MG/1
TABLET, FILM COATED ORAL
Qty: 6 TABLET | Refills: 0 | Status: SHIPPED | OUTPATIENT
Start: 2023-12-08 | End: 2023-12-19 | Stop reason: ALTCHOICE

## 2023-12-08 RX ORDER — TRAZODONE HYDROCHLORIDE 150 MG/1
150 TABLET ORAL NIGHTLY
Qty: 90 TABLET | Refills: 1 | Status: SHIPPED | OUTPATIENT
Start: 2023-12-08

## 2023-12-08 RX ORDER — ALBUTEROL SULFATE 90 UG/1
2 AEROSOL, METERED RESPIRATORY (INHALATION) EVERY 4 HOURS PRN
Qty: 8.5 G | Refills: 1 | Status: SHIPPED | OUTPATIENT
Start: 2023-12-08 | End: 2024-12-07

## 2023-12-08 RX ORDER — BENZONATATE 200 MG/1
200 CAPSULE ORAL 3 TIMES DAILY PRN
Qty: 42 CAPSULE | Refills: 0 | Status: SHIPPED | OUTPATIENT
Start: 2023-12-08 | End: 2024-01-07

## 2023-12-08 RX ORDER — METHYLPREDNISOLONE 4 MG/1
TABLET ORAL
Qty: 21 TABLET | Refills: 0 | Status: SHIPPED | OUTPATIENT
Start: 2023-12-08 | End: 2024-01-03 | Stop reason: HOSPADM

## 2023-12-08 RX ORDER — LISINOPRIL 20 MG/1
20 TABLET ORAL DAILY
Qty: 90 TABLET | Refills: 1 | Status: SHIPPED | OUTPATIENT
Start: 2023-12-08

## 2023-12-08 ASSESSMENT — PATIENT HEALTH QUESTIONNAIRE - PHQ9
1. LITTLE INTEREST OR PLEASURE IN DOING THINGS: NOT AT ALL
2. FEELING DOWN, DEPRESSED OR HOPELESS: NOT AT ALL
SUM OF ALL RESPONSES TO PHQ9 QUESTIONS 1 AND 2: 0

## 2023-12-08 NOTE — PROGRESS NOTES
Subjective   Patient ID: Becky Mendiola is a 48 y.o. female who presents for Hypertension, Insomnia, Migraines, and Multiple Sclerosis.  Today she is accompanied by alone.     HPI    1.  Left knee pain   Patient has a chronic history of left-sided knee pain as well as arthritis  She even followed up with an orthopedic provider, Dr. Berman, back in 2020  She is status post left knee scope lateral release on 6/8/2020  MRI of the knee 11/2023 shows severe patellofemoral compartment cartilage loss as well as mild cartilage loss in the tibiofemoral compartments   There is also a cyst that has been stable from the prior visit     2. Benign essential hypertension  Continues to take lisinopril 20 mg daily as indicated in the chart  Blood pressure today 122/80.  Denies any cardiopulmonary symptoms  Requesting refills     3. Insomnia  Feels well with the trazodone 150 mg at bedtime.  She is well rested in the morning. Desires to continue at current dose.  Requesting refills to be sent to her local pharmacy     4. Vitamin D deficiency  Continues to take vitamin D 50,000 International Units weekly.  Asking to get this rechecked    5. Migraines  She has been using the Ubrelvy and stated that this works fgreat  Willing and wishing to continue this medication on as-needed basis  Requesting refills  Denies numbness or tingling, dysphagia, weakness, loss of eye sight.    6.  Cough  Patient has been having an ongoing cough since last Thursday.  Denies any congestion, mucus, sore throat, rhinorrhea.  Did have some mild congestion early on but thought that was related to allergies.  Patient states she is coughing so much is leading to dizziness, she is unable to sleep at night and has mild chest/rib pain due to the cough.  She also has not been hungry, states that she has to force herself to eat.  Has tried Mucinex, TheraFlu and Delsym without any improvement.    Current Outpatient Medications on File Prior to Visit   Medication Sig  Dispense Refill    albuterol (ProAir HFA) 90 mcg/actuation inhaler Inhale 2 puffs every 4 hours if needed for wheezing or shortness of breath. 8.5 g 1    ergocalciferol (Vitamin D-2) 1.25 MG (57996 UT) capsule TAKE 1 CAPSULE (1,250 MCG) BY MOUTH 1 (ONE) TIME PER WEEK. 24 capsule 0    lisinopril 20 mg tablet TAKE 1 TABLET BY MOUTH EVERY DAY 30 tablet 0    naproxen (Naprosyn) 500 mg tablet Take 1 tablet (500 mg) by mouth 2 times a day as needed for mild pain (1 - 3) (pain). 60 tablet 2    traZODone (Desyrel) 150 mg tablet TAKE 1 TABLET (150 MG) BY MOUTH ONCE DAILY AT BEDTIME. 30 tablet 0    ubrogepant (Ubrelvy) 100 mg tablet tablet Take 1 tablet (100 mg) by mouth if needed (Migraines) for up to 1 dose. 30 tablet 1    [DISCONTINUED] fluconazole (Diflucan) 150 mg tablet Take 1 dose now and then repeat in 3 days if continued symptoms occur 2 tablet 0     No current facility-administered medications on file prior to visit.        Allergies   Allergen Reactions    Amoxicillin Unknown    Sulfa (Sulfonamide Antibiotics) Unknown       Immunization History   Administered Date(s) Administered    Influenza, seasonal, injectable 10/12/2020    Tdap vaccine, age 7 year and older (BOOSTRIX) 08/01/2017, 01/10/2023         Review of Systems  All pertinent positive symptoms are included in the history of present illness.  All other systems have been reviewed and are negative and noncontributory to this patient's current ailments.     Objective   /70 (BP Location: Left arm, Patient Position: Sitting, BP Cuff Size: Adult)   Pulse 102   Wt 92.8 kg (204 lb 9.6 oz)   BMI 32.04 kg/m²   BSA: 2.09 meters squared  Office Visit on 11/22/2023   Component Date Value Ref Range Status    POC Color, Urine 11/22/2023 Straw  Straw, Yellow, Light-Yellow Final    POC Appearance, Urine 11/22/2023 Clear  Clear Final    POC Glucose, Urine 11/22/2023 NEGATIVE  NEGATIVE mg/dl Final    POC Bilirubin, Urine 11/22/2023 NEGATIVE  NEGATIVE Final    POC  Ketones, Urine 11/22/2023 NEGATIVE  NEGATIVE mg/dl Final    POC Specific Gravity, Urine 11/22/2023 1.020  1.005 - 1.035 Final    POC Blood, Urine 11/22/2023 TRACE-Intact (A)  NEGATIVE Final    POC PH, Urine 11/22/2023 6.0  No Reference Range Established PH Final    POC Protein, Urine 11/22/2023 NEGATIVE  NEGATIVE, 30 (1+) mg/dl Final    POC Urobilinogen, Urine 11/22/2023 0.2  0.2, 1.0 EU/DL Final    Poc Nitrite, Urine 11/22/2023 NEGATIVE  NEGATIVE Final    POC Leukocytes, Urine 11/22/2023 NEGATIVE  NEGATIVE Final    Color, Urine 11/22/2023 Yellow  Straw, Yellow Final    Appearance, Urine 11/22/2023 Hazy (N)  Clear Final    Specific Gravity, Urine 11/22/2023 1.024  1.005 - 1.035 Final    pH, Urine 11/22/2023 5.0  5.0, 5.5, 6.0, 6.5, 7.0, 7.5, 8.0 Final    Protein, Urine 11/22/2023 NEGATIVE  NEGATIVE mg/dL Final    Glucose, Urine 11/22/2023 NEGATIVE  NEGATIVE mg/dL Final    Blood, Urine 11/22/2023 NEGATIVE  NEGATIVE Final    Ketones, Urine 11/22/2023 NEGATIVE  NEGATIVE mg/dL Final    Bilirubin, Urine 11/22/2023 NEGATIVE  NEGATIVE Final    Urobilinogen, Urine 11/22/2023 <2.0  <2.0 mg/dL Final    Nitrite, Urine 11/22/2023 NEGATIVE  NEGATIVE Final    Leukocyte Esterase, Urine 11/22/2023 NEGATIVE  NEGATIVE Final    Urine Culture 11/22/2023 No significant growth   Final       Physical Exam  CONSTITUTIONAL - well nourished, well developed, looks like stated age, in no acute distress, not ill-appearing, and not tired appearing  SKIN - normal skin color and pigmentation, normal skin turgor without rash, lesions, or nodules visualized  HEAD - no trauma, normocephalic  EYES - pupils are equal and reactive to light, extraocular muscles are intact, and normal external exam  ENT - normal tongue movement and throat normal, no exudate, nasal passage without discharge and patent  NECK - supple without rigidity, no neck mass was observed,   LUNG - mild wheezing throughout, no crackles  CARDIAC - regular rate and regular rhythm, no  skipped beats, no murmur  ABDOMEN - no organomegaly, soft, nontender, nondistended, normal bowel sounds  EXTREMITIES - no edema, no deformities  NEUROLOGICAL - normal gait, normal balance, normal motor, alert, oriented and no focal signs  PSYCHIATRIC - alert, pleasant and cordial, age-appropriate      Assessment/Plan   1.  Left knee pain  MRI done 11/2023  Referral for orthopedic provider given    2. Benign essential hypertension  Stable, continue lisinopril 20 mg daily.  Blood pressure goal should be below 130/80, ideally 120/80  Please check your blood pressure at home and keep a log.  Please bring this log with you to your next appointment     3. Insomnia  Stable, continue trazodone 150 mg at night.     4. Vitamin D deficiency  We will notify you of the results and make recommendations accordingly.    5. Migraines  Refills of the Ubrelvy were sent to your local pharmacy  Please continue keeping us in the loop on how you are feeling  Stable without any changes    6.  Cough  We will start you on a Z-Santino as well as Tessalon Perles for your cough.  In addition we will be sending a Medrol Dosepak to the pharmacy and refill your albuterol inhaler.  Risks, benefits, and options of treatment(s) were discussed after reviewing all current medication(s) and drug allergy(ies)  I opted for the treatment that we discussed with instructions on the medication use for your underlying medical ailment(s)  I encouraged supportive care such as rest, fluids and Advil/Tylenol as warranted  Return to the clinic in 7-10 days or sooner if symptoms worsen or persist as we will then further evaluate    If you have any concerns or questions please call the office.    Follow up in 6 months for your annual physical exam.

## 2023-12-19 ENCOUNTER — OFFICE VISIT (OUTPATIENT)
Dept: PRIMARY CARE | Facility: CLINIC | Age: 48
End: 2023-12-19
Payer: COMMERCIAL

## 2023-12-19 VITALS — SYSTOLIC BLOOD PRESSURE: 118 MMHG | HEART RATE: 95 BPM | OXYGEN SATURATION: 99 % | DIASTOLIC BLOOD PRESSURE: 74 MMHG

## 2023-12-19 DIAGNOSIS — R05.1 ACUTE COUGH: ICD-10-CM

## 2023-12-19 DIAGNOSIS — J06.9 ACUTE URI: Primary | ICD-10-CM

## 2023-12-19 PROCEDURE — 3078F DIAST BP <80 MM HG: CPT | Performed by: STUDENT IN AN ORGANIZED HEALTH CARE EDUCATION/TRAINING PROGRAM

## 2023-12-19 PROCEDURE — 1036F TOBACCO NON-USER: CPT | Performed by: STUDENT IN AN ORGANIZED HEALTH CARE EDUCATION/TRAINING PROGRAM

## 2023-12-19 PROCEDURE — 99214 OFFICE O/P EST MOD 30 MIN: CPT | Performed by: STUDENT IN AN ORGANIZED HEALTH CARE EDUCATION/TRAINING PROGRAM

## 2023-12-19 PROCEDURE — 3074F SYST BP LT 130 MM HG: CPT | Performed by: STUDENT IN AN ORGANIZED HEALTH CARE EDUCATION/TRAINING PROGRAM

## 2023-12-19 RX ORDER — LEVOFLOXACIN 750 MG/1
750 TABLET ORAL DAILY
Qty: 5 TABLET | Refills: 0 | Status: SHIPPED | OUTPATIENT
Start: 2023-12-19 | End: 2023-12-24

## 2023-12-19 ASSESSMENT — ENCOUNTER SYMPTOMS
FEVER: 0
HEARTBURN: 0
SHORTNESS OF BREATH: 0
COUGH: 1
WHEEZING: 1
MYALGIAS: 0
RHINORRHEA: 1
CHILLS: 0
WEIGHT LOSS: 0
SORE THROAT: 0
HEADACHES: 1
SWEATS: 0
HEMOPTYSIS: 0

## 2023-12-19 NOTE — PROGRESS NOTES
Subjective   Patient ID: Becky Mendiola is a 48 y.o. female who presents for Cough.  Today she is accompanied by alone.     Cough  This is a recurrent problem. The current episode started 1 to 4 weeks ago. The problem has been waxing and waning. The problem occurs every few minutes. The cough is Productive of sputum. Associated symptoms include headaches, nasal congestion, rhinorrhea and wheezing. Pertinent negatives include no chest pain, chills, ear congestion, ear pain, fever, heartburn, hemoptysis, myalgias, postnasal drip, rash, sore throat, shortness of breath, sweats or weight loss. The symptoms are aggravated by lying down.      Cough  Patient has been having an ongoing cough since beginning of December  Denies any sore throat, rhinorrhea  Complains of productive cough, intermittent fevers, shortness of breath  She also has not been hungry, states that she has to force herself to eat.  Has tried Mucinex, TheraFlu and Delsym without any improvement.  Seen in office on 12/8, Given Z-thong, medrol pack, albuterol refill and Tessalon perles for cough   Felt well with the steroid pack and immediately felt sick again  Informs us that her father is in the hospital for pneumonia, recent contact  History of smoking induced asthma, still using albuterol as needed with her being sick    Current Outpatient Medications on File Prior to Visit   Medication Sig Dispense Refill    albuterol (ProAir HFA) 90 mcg/actuation inhaler Inhale 2 puffs every 4 hours if needed for wheezing or shortness of breath. 8.5 g 1    benzonatate (Tessalon) 200 mg capsule Take 1 capsule (200 mg) by mouth 3 times a day as needed for cough. Do not crush or chew. 42 capsule 0    ergocalciferol (Vitamin D-2) 1.25 MG (89416 UT) capsule TAKE 1 CAPSULE (1,250 MCG) BY MOUTH 1 (ONE) TIME PER WEEK. 24 capsule 0    lisinopril 20 mg tablet Take 1 tablet (20 mg) by mouth once daily. 90 tablet 1    methylPREDNISolone (Medrol Dospak) 4 mg tablets Take as directed  on package. 21 tablet 0    naproxen (Naprosyn) 500 mg tablet Take 1 tablet (500 mg) by mouth 2 times a day as needed for mild pain (1 - 3) (pain). 60 tablet 2    traZODone (Desyrel) 150 mg tablet Take 1 tablet (150 mg) by mouth once daily at bedtime. 90 tablet 1    ubrogepant (Ubrelvy) 100 mg tablet tablet Take 1 tablet (100 mg) by mouth if needed (Migraines) for up to 1 dose. 30 tablet 1    [DISCONTINUED] azithromycin (Zithromax) 250 mg tablet Take 2 tablets (500 mg) by mouth once daily for 1 day, THEN 1 tablet (250 mg) once daily for 4 days. Take 2 tabs (500 mg) by mouth today, than 1 daily for 4 days.. 6 tablet 0     No current facility-administered medications on file prior to visit.        Allergies   Allergen Reactions    Amoxicillin Unknown    Sulfa (Sulfonamide Antibiotics) Unknown       Immunization History   Administered Date(s) Administered    Influenza, seasonal, injectable 10/12/2020    Tdap vaccine, age 7 year and older (BOOSTRIX) 08/01/2017, 01/10/2023         Review of Systems   Constitutional:  Negative for chills, fever and weight loss.   HENT:  Positive for rhinorrhea. Negative for ear pain, postnasal drip and sore throat.    Respiratory:  Positive for cough and wheezing. Negative for hemoptysis and shortness of breath.    Cardiovascular:  Negative for chest pain.   Gastrointestinal:  Negative for heartburn.   Musculoskeletal:  Negative for myalgias.   Skin:  Negative for rash.   Neurological:  Positive for headaches.     All pertinent positive symptoms are included in the history of present illness.  All other systems have been reviewed and are negative and noncontributory to this patient's current ailments.     Objective   /74 (BP Location: Left arm, Patient Position: Sitting, BP Cuff Size: Adult)   Pulse 95   SpO2 99%   BSA: There is no height or weight on file to calculate BSA.      Physical Exam  CONSTITUTIONAL - well nourished, well developed, looks like stated age, in no acute  distress, not ill-appearing, and not tired appearing  SKIN - normal skin color and pigmentation, normal skin turgor without rash, lesions, or nodules visualized  HEAD - no trauma, normocephalic  EYES - pupils are equal and reactive to light, extraocular muscles are intact, and normal external exam  NECK - supple without rigidity, no neck mass was observed, no thyromegaly or thyroid nodules  CHEST - coughing with deep inspiration, no wheezing, no crackles and no rales  CARDIAC - regular rate and regular rhythm, no skipped beats, no murmur  EXTREMITIES - no edema, no deformities  NEUROLOGICAL - alert, oriented and no focal signs  PSYCHIATRIC - alert, pleasant and cordial, age-appropriate  IMMUNOLOGIC - no cervical lymphadenopathy     Assessment/Plan   Cough  Due to being sick for the past 3 weeks and now apparently failing antibiotics, we did order chest x-ray to be done at your earliest convenience  Concern for bronchitis vs pneumonia  Will prescribe Levaquin 750mg x5 days  Risks, benefits, and options of treatment(s) were discussed after reviewing all current medication(s) and drug allergy(ies)  I opted for the treatment that we discussed with instructions on the medication use for your underlying medical ailment(s)  I encouraged supportive care such as rest, fluids and Advil/Tylenol as warranted  Return to the clinic in 7-10 days or sooner if symptoms worsen or persist as we will then further evaluate

## 2023-12-20 ENCOUNTER — OFFICE VISIT (OUTPATIENT)
Dept: ORTHOPEDIC SURGERY | Facility: CLINIC | Age: 48
End: 2023-12-20
Payer: COMMERCIAL

## 2023-12-20 ENCOUNTER — TELEPHONE (OUTPATIENT)
Dept: ORTHOPEDIC SURGERY | Facility: CLINIC | Age: 48
End: 2023-12-20

## 2023-12-20 ENCOUNTER — ANCILLARY PROCEDURE (OUTPATIENT)
Dept: RADIOLOGY | Facility: CLINIC | Age: 48
End: 2023-12-20
Payer: COMMERCIAL

## 2023-12-20 VITALS — WEIGHT: 204 LBS | HEIGHT: 67 IN | BODY MASS INDEX: 32.02 KG/M2

## 2023-12-20 DIAGNOSIS — R05.1 ACUTE COUGH: ICD-10-CM

## 2023-12-20 DIAGNOSIS — J06.9 ACUTE URI: ICD-10-CM

## 2023-12-20 DIAGNOSIS — M25.562 LEFT KNEE PAIN, UNSPECIFIED CHRONICITY: ICD-10-CM

## 2023-12-20 DIAGNOSIS — M17.12 OSTEOARTHRITIS OF LEFT PATELLOFEMORAL JOINT: Primary | ICD-10-CM

## 2023-12-20 PROCEDURE — 1036F TOBACCO NON-USER: CPT | Performed by: STUDENT IN AN ORGANIZED HEALTH CARE EDUCATION/TRAINING PROGRAM

## 2023-12-20 PROCEDURE — 71046 X-RAY EXAM CHEST 2 VIEWS: CPT

## 2023-12-20 PROCEDURE — 99204 OFFICE O/P NEW MOD 45 MIN: CPT | Performed by: STUDENT IN AN ORGANIZED HEALTH CARE EDUCATION/TRAINING PROGRAM

## 2023-12-20 PROCEDURE — 71046 X-RAY EXAM CHEST 2 VIEWS: CPT | Performed by: RADIOLOGY
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
## 2023-12-22 NOTE — RESULT ENCOUNTER NOTE
X-ray of chest is well within normal limits with no abnormalities noted    I would recommend continued treatment that was discussed at her appointment

## 2024-01-01 ENCOUNTER — HOSPITAL ENCOUNTER (OUTPATIENT)
Facility: HOSPITAL | Age: 49
Setting detail: OBSERVATION
Discharge: HOME | End: 2024-01-03
Attending: EMERGENCY MEDICINE | Admitting: INTERNAL MEDICINE
Payer: COMMERCIAL

## 2024-01-01 ENCOUNTER — APPOINTMENT (OUTPATIENT)
Dept: RADIOLOGY | Facility: HOSPITAL | Age: 49
End: 2024-01-01
Payer: COMMERCIAL

## 2024-01-01 DIAGNOSIS — H53.8 BLURRY VISION, BILATERAL: Primary | ICD-10-CM

## 2024-01-01 DIAGNOSIS — G35 MULTIPLE SCLEROSIS (MULTI): ICD-10-CM

## 2024-01-01 DIAGNOSIS — H46.9 RIGHT OPTIC NEURITIS: ICD-10-CM

## 2024-01-01 LAB
ALBUMIN SERPL BCP-MCNC: 4.4 G/DL (ref 3.4–5)
ALP SERPL-CCNC: 71 U/L (ref 33–110)
ALT SERPL W P-5'-P-CCNC: 42 U/L (ref 7–45)
ANION GAP SERPL CALC-SCNC: 15 MMOL/L (ref 10–20)
AST SERPL W P-5'-P-CCNC: 27 U/L (ref 9–39)
BASOPHILS # BLD AUTO: 0.04 X10*3/UL (ref 0–0.1)
BASOPHILS NFR BLD AUTO: 0.6 %
BILIRUB SERPL-MCNC: 0.7 MG/DL (ref 0–1.2)
BUN SERPL-MCNC: 14 MG/DL (ref 6–23)
CALCIUM SERPL-MCNC: 9.1 MG/DL (ref 8.6–10.3)
CHLORIDE SERPL-SCNC: 105 MMOL/L (ref 98–107)
CO2 SERPL-SCNC: 23 MMOL/L (ref 21–32)
CREAT SERPL-MCNC: 0.83 MG/DL (ref 0.5–1.05)
EOSINOPHIL # BLD AUTO: 0.19 X10*3/UL (ref 0–0.7)
EOSINOPHIL NFR BLD AUTO: 2.6 %
ERYTHROCYTE [DISTWIDTH] IN BLOOD BY AUTOMATED COUNT: 12.3 % (ref 11.5–14.5)
GFR SERPL CREATININE-BSD FRML MDRD: 87 ML/MIN/1.73M*2
GLUCOSE SERPL-MCNC: 92 MG/DL (ref 74–99)
HCT VFR BLD AUTO: 43.3 % (ref 36–46)
HGB BLD-MCNC: 14.3 G/DL (ref 12–16)
IMM GRANULOCYTES # BLD AUTO: 0.05 X10*3/UL (ref 0–0.7)
IMM GRANULOCYTES NFR BLD AUTO: 0.7 % (ref 0–0.9)
LYMPHOCYTES # BLD AUTO: 2.11 X10*3/UL (ref 1.2–4.8)
LYMPHOCYTES NFR BLD AUTO: 29.3 %
MCH RBC QN AUTO: 30.6 PG (ref 26–34)
MCHC RBC AUTO-ENTMCNC: 33 G/DL (ref 32–36)
MCV RBC AUTO: 93 FL (ref 80–100)
MONOCYTES # BLD AUTO: 0.63 X10*3/UL (ref 0.1–1)
MONOCYTES NFR BLD AUTO: 8.8 %
NEUTROPHILS # BLD AUTO: 4.17 X10*3/UL (ref 1.2–7.7)
NEUTROPHILS NFR BLD AUTO: 58 %
NRBC BLD-RTO: 0 /100 WBCS (ref 0–0)
PLATELET # BLD AUTO: 205 X10*3/UL (ref 150–450)
POTASSIUM SERPL-SCNC: 3.9 MMOL/L (ref 3.5–5.3)
PROT SERPL-MCNC: 7 G/DL (ref 6.4–8.2)
RBC # BLD AUTO: 4.68 X10*6/UL (ref 4–5.2)
SODIUM SERPL-SCNC: 139 MMOL/L (ref 136–145)
WBC # BLD AUTO: 7.2 X10*3/UL (ref 4.4–11.3)

## 2024-01-01 PROCEDURE — 85025 COMPLETE CBC W/AUTO DIFF WBC: CPT | Performed by: EMERGENCY MEDICINE

## 2024-01-01 PROCEDURE — 70450 CT HEAD/BRAIN W/O DYE: CPT | Performed by: STUDENT IN AN ORGANIZED HEALTH CARE EDUCATION/TRAINING PROGRAM

## 2024-01-01 PROCEDURE — 80053 COMPREHEN METABOLIC PANEL: CPT | Performed by: EMERGENCY MEDICINE

## 2024-01-01 PROCEDURE — 36415 COLL VENOUS BLD VENIPUNCTURE: CPT | Performed by: EMERGENCY MEDICINE

## 2024-01-01 PROCEDURE — 2500000004 HC RX 250 GENERAL PHARMACY W/ HCPCS (ALT 636 FOR OP/ED): Performed by: EMERGENCY MEDICINE

## 2024-01-01 PROCEDURE — 70450 CT HEAD/BRAIN W/O DYE: CPT

## 2024-01-01 PROCEDURE — 96365 THER/PROPH/DIAG IV INF INIT: CPT

## 2024-01-01 PROCEDURE — 99285 EMERGENCY DEPT VISIT HI MDM: CPT | Performed by: EMERGENCY MEDICINE

## 2024-01-01 RX ADMIN — DEXTROSE MONOHYDRATE 1000 MG: 50 INJECTION, SOLUTION INTRAVENOUS at 23:56

## 2024-01-01 ASSESSMENT — PAIN - FUNCTIONAL ASSESSMENT: PAIN_FUNCTIONAL_ASSESSMENT: 0-10

## 2024-01-01 ASSESSMENT — PAIN SCALES - GENERAL: PAINLEVEL_OUTOF10: 0 - NO PAIN

## 2024-01-01 ASSESSMENT — COLUMBIA-SUICIDE SEVERITY RATING SCALE - C-SSRS
1. IN THE PAST MONTH, HAVE YOU WISHED YOU WERE DEAD OR WISHED YOU COULD GO TO SLEEP AND NOT WAKE UP?: NO
2. HAVE YOU ACTUALLY HAD ANY THOUGHTS OF KILLING YOURSELF?: NO
6. HAVE YOU EVER DONE ANYTHING, STARTED TO DO ANYTHING, OR PREPARED TO DO ANYTHING TO END YOUR LIFE?: NO

## 2024-01-01 NOTE — ED TRIAGE NOTES
"Pt here with c/o \"foggy vision\" for about 3 days. Pt denies fall or any other neurological sx's. Pt thinks this may be an ms flare up.   "

## 2024-01-02 ENCOUNTER — APPOINTMENT (OUTPATIENT)
Dept: RADIOLOGY | Facility: HOSPITAL | Age: 49
End: 2024-01-02
Payer: COMMERCIAL

## 2024-01-02 LAB
ANION GAP SERPL CALC-SCNC: 13 MMOL/L (ref 10–20)
BUN SERPL-MCNC: 17 MG/DL (ref 6–23)
CALCIUM SERPL-MCNC: 9.1 MG/DL (ref 8.6–10.3)
CHLORIDE SERPL-SCNC: 104 MMOL/L (ref 98–107)
CO2 SERPL-SCNC: 22 MMOL/L (ref 21–32)
CREAT SERPL-MCNC: 0.69 MG/DL (ref 0.5–1.05)
ERYTHROCYTE [DISTWIDTH] IN BLOOD BY AUTOMATED COUNT: 12.1 % (ref 11.5–14.5)
GFR SERPL CREATININE-BSD FRML MDRD: >90 ML/MIN/1.73M*2
GLUCOSE SERPL-MCNC: 180 MG/DL (ref 74–99)
HCT VFR BLD AUTO: 42.5 % (ref 36–46)
HGB BLD-MCNC: 14.5 G/DL (ref 12–16)
MCH RBC QN AUTO: 31.2 PG (ref 26–34)
MCHC RBC AUTO-ENTMCNC: 34.1 G/DL (ref 32–36)
MCV RBC AUTO: 91 FL (ref 80–100)
NRBC BLD-RTO: 0 /100 WBCS (ref 0–0)
PLATELET # BLD AUTO: 198 X10*3/UL (ref 150–450)
POTASSIUM SERPL-SCNC: 4 MMOL/L (ref 3.5–5.3)
RBC # BLD AUTO: 4.65 X10*6/UL (ref 4–5.2)
SODIUM SERPL-SCNC: 135 MMOL/L (ref 136–145)
WBC # BLD AUTO: 7.3 X10*3/UL (ref 4.4–11.3)

## 2024-01-02 PROCEDURE — 36415 COLL VENOUS BLD VENIPUNCTURE: CPT | Performed by: NURSE PRACTITIONER

## 2024-01-02 PROCEDURE — 70543 MRI ORBT/FAC/NCK W/O &W/DYE: CPT | Performed by: RADIOLOGY

## 2024-01-02 PROCEDURE — 2550000001 HC RX 255 CONTRASTS: Performed by: EMERGENCY MEDICINE

## 2024-01-02 PROCEDURE — 99222 1ST HOSP IP/OBS MODERATE 55: CPT | Performed by: PSYCHIATRY & NEUROLOGY

## 2024-01-02 PROCEDURE — 96366 THER/PROPH/DIAG IV INF ADDON: CPT

## 2024-01-02 PROCEDURE — 85027 COMPLETE CBC AUTOMATED: CPT | Performed by: NURSE PRACTITIONER

## 2024-01-02 PROCEDURE — 99222 1ST HOSP IP/OBS MODERATE 55: CPT | Performed by: NURSE PRACTITIONER

## 2024-01-02 PROCEDURE — 96375 TX/PRO/DX INJ NEW DRUG ADDON: CPT

## 2024-01-02 PROCEDURE — 2500000004 HC RX 250 GENERAL PHARMACY W/ HCPCS (ALT 636 FOR OP/ED): Performed by: HOSPITALIST

## 2024-01-02 PROCEDURE — 70553 MRI BRAIN STEM W/O & W/DYE: CPT | Performed by: RADIOLOGY

## 2024-01-02 PROCEDURE — 80048 BASIC METABOLIC PNL TOTAL CA: CPT | Performed by: NURSE PRACTITIONER

## 2024-01-02 PROCEDURE — 2500000001 HC RX 250 WO HCPCS SELF ADMINISTERED DRUGS (ALT 637 FOR MEDICARE OP): Performed by: HOSPITALIST

## 2024-01-02 PROCEDURE — 70543 MRI ORBT/FAC/NCK W/O &W/DYE: CPT

## 2024-01-02 PROCEDURE — A9575 INJ GADOTERATE MEGLUMI 0.1ML: HCPCS | Performed by: EMERGENCY MEDICINE

## 2024-01-02 PROCEDURE — G0378 HOSPITAL OBSERVATION PER HR: HCPCS

## 2024-01-02 PROCEDURE — 2500000004 HC RX 250 GENERAL PHARMACY W/ HCPCS (ALT 636 FOR OP/ED): Performed by: NURSE PRACTITIONER

## 2024-01-02 PROCEDURE — 70553 MRI BRAIN STEM W/O & W/DYE: CPT

## 2024-01-02 RX ORDER — ACETAMINOPHEN 325 MG/1
650 TABLET ORAL EVERY 4 HOURS PRN
Status: DISCONTINUED | OUTPATIENT
Start: 2024-01-02 | End: 2024-01-03 | Stop reason: HOSPADM

## 2024-01-02 RX ORDER — ALBUTEROL SULFATE 90 UG/1
2 AEROSOL, METERED RESPIRATORY (INHALATION) EVERY 4 HOURS PRN
Status: DISCONTINUED | OUTPATIENT
Start: 2024-01-02 | End: 2024-01-02 | Stop reason: CLARIF

## 2024-01-02 RX ORDER — DIAZEPAM 5 MG/ML
5 INJECTION, SOLUTION INTRAMUSCULAR; INTRAVENOUS ONCE
Status: COMPLETED | OUTPATIENT
Start: 2024-01-02 | End: 2024-01-02

## 2024-01-02 RX ORDER — ONDANSETRON HYDROCHLORIDE 2 MG/ML
4 INJECTION, SOLUTION INTRAVENOUS EVERY 8 HOURS PRN
Status: DISCONTINUED | OUTPATIENT
Start: 2024-01-02 | End: 2024-01-03 | Stop reason: HOSPADM

## 2024-01-02 RX ORDER — ALBUTEROL SULFATE 0.83 MG/ML
2.5 SOLUTION RESPIRATORY (INHALATION) EVERY 4 HOURS PRN
Status: DISCONTINUED | OUTPATIENT
Start: 2024-01-02 | End: 2024-01-03 | Stop reason: HOSPADM

## 2024-01-02 RX ORDER — POLYETHYLENE GLYCOL 3350 17 G/17G
17 POWDER, FOR SOLUTION ORAL DAILY
Status: DISCONTINUED | OUTPATIENT
Start: 2024-01-02 | End: 2024-01-03 | Stop reason: HOSPADM

## 2024-01-02 RX ORDER — ONDANSETRON 4 MG/1
4 TABLET, ORALLY DISINTEGRATING ORAL EVERY 8 HOURS PRN
Status: DISCONTINUED | OUTPATIENT
Start: 2024-01-02 | End: 2024-01-03 | Stop reason: HOSPADM

## 2024-01-02 RX ORDER — TALC
3 POWDER (GRAM) TOPICAL DAILY
Status: DISCONTINUED | OUTPATIENT
Start: 2024-01-02 | End: 2024-01-03 | Stop reason: HOSPADM

## 2024-01-02 RX ORDER — GADOTERATE MEGLUMINE 376.9 MG/ML
19 INJECTION INTRAVENOUS
Status: COMPLETED | OUTPATIENT
Start: 2024-01-02 | End: 2024-01-02

## 2024-01-02 RX ORDER — TRAZODONE HYDROCHLORIDE 50 MG/1
150 TABLET ORAL NIGHTLY
Status: DISCONTINUED | OUTPATIENT
Start: 2024-01-02 | End: 2024-01-03 | Stop reason: HOSPADM

## 2024-01-02 RX ORDER — LISINOPRIL 20 MG/1
20 TABLET ORAL DAILY
Status: DISCONTINUED | OUTPATIENT
Start: 2024-01-02 | End: 2024-01-03 | Stop reason: HOSPADM

## 2024-01-02 RX ADMIN — DIAZEPAM 5 MG: 5 INJECTION, SOLUTION INTRAMUSCULAR; INTRAVENOUS at 12:13

## 2024-01-02 RX ADMIN — TRAZODONE HYDROCHLORIDE 150 MG: 50 TABLET ORAL at 01:27

## 2024-01-02 RX ADMIN — GADOTERATE MEGLUMINE 19 ML: 376.9 INJECTION INTRAVENOUS at 12:54

## 2024-01-02 RX ADMIN — Medication 3 MG: at 20:51

## 2024-01-02 RX ADMIN — Medication 3 MG: at 01:27

## 2024-01-02 RX ADMIN — TRAZODONE HYDROCHLORIDE 150 MG: 50 TABLET ORAL at 20:51

## 2024-01-02 RX ADMIN — POLYETHYLENE GLYCOL 3350 17 G: 17 POWDER, FOR SOLUTION ORAL at 09:02

## 2024-01-02 RX ADMIN — LISINOPRIL 20 MG: 20 TABLET ORAL at 09:02

## 2024-01-02 RX ADMIN — DEXTROSE MONOHYDRATE 1000 MG: 50 INJECTION, SOLUTION INTRAVENOUS at 20:50

## 2024-01-02 SDOH — ECONOMIC STABILITY: TRANSPORTATION INSECURITY

## 2024-01-02 SDOH — SOCIAL STABILITY: SOCIAL INSECURITY: HAS ANYONE EVER THREATENED TO HURT YOUR FAMILY OR YOUR PETS?: NO

## 2024-01-02 SDOH — ECONOMIC STABILITY: HOUSING INSECURITY
IN THE LAST 12 MONTHS, WAS THERE A TIME WHEN YOU DID NOT HAVE A STEADY PLACE TO SLEEP OR SLEPT IN A SHELTER (INCLUDING NOW)?: NO

## 2024-01-02 SDOH — SOCIAL STABILITY: SOCIAL INSECURITY: DO YOU FEEL ANYONE HAS EXPLOITED OR TAKEN ADVANTAGE OF YOU FINANCIALLY OR OF YOUR PERSONAL PROPERTY?: NO

## 2024-01-02 SDOH — ECONOMIC STABILITY: FOOD INSECURITY: WITHIN THE PAST 12 MONTHS, YOU WORRIED THAT YOUR FOOD WOULD RUN OUT BEFORE YOU GOT MONEY TO BUY MORE.: NEVER TRUE

## 2024-01-02 SDOH — ECONOMIC STABILITY: GENERAL

## 2024-01-02 SDOH — SOCIAL STABILITY: SOCIAL INSECURITY: ABUSE: ADULT

## 2024-01-02 SDOH — ECONOMIC STABILITY: HOUSING INSECURITY: IN THE LAST 12 MONTHS, HOW MANY PLACES HAVE YOU LIVED?: 1

## 2024-01-02 SDOH — ECONOMIC STABILITY: HOUSING INSECURITY

## 2024-01-02 SDOH — ECONOMIC STABILITY: FOOD INSECURITY: WITHIN THE PAST 12 MONTHS, THE FOOD YOU BOUGHT JUST DIDN’T LAST AND YOU DIDN’T HAVE MONEY TO GET MORE.: NEVER TRUE

## 2024-01-02 SDOH — ECONOMIC STABILITY: HOUSING INSECURITY: IN THE LAST 12 MONTHS, WAS THERE A TIME WHEN YOU WERE NOT ABLE TO PAY THE MORTGAGE OR RENT ON TIME?: NO

## 2024-01-02 SDOH — ECONOMIC STABILITY: INCOME INSECURITY: IN THE LAST 12 MONTHS, WAS THERE A TIME WHEN YOU WERE NOT ABLE TO PAY THE MORTGAGE OR RENT ON TIME?: NO

## 2024-01-02 SDOH — ECONOMIC STABILITY: FOOD INSECURITY: WITHIN THE PAST 12 MONTHS, THE FOOD YOU BOUGHT JUST DIDN'T LAST AND YOU DIDN'T HAVE MONEY TO GET MORE.: NEVER TRUE

## 2024-01-02 SDOH — ECONOMIC STABILITY: FOOD INSECURITY

## 2024-01-02 SDOH — SOCIAL STABILITY: SOCIAL INSECURITY: WERE YOU ABLE TO COMPLETE ALL THE BEHAVIORAL HEALTH SCREENINGS?: YES

## 2024-01-02 SDOH — SOCIAL STABILITY: SOCIAL INSECURITY: HAVE YOU HAD THOUGHTS OF HARMING ANYONE ELSE?: NO

## 2024-01-02 SDOH — ECONOMIC STABILITY: TRANSPORTATION INSECURITY
IN THE PAST 12 MONTHS, HAS LACK OF TRANSPORTATION KEPT YOU FROM MEETINGS, WORK, OR FROM GETTING THINGS NEEDED FOR DAILY LIVING?: NO

## 2024-01-02 SDOH — SOCIAL STABILITY: SOCIAL INSECURITY: DO YOU FEEL UNSAFE GOING BACK TO THE PLACE WHERE YOU ARE LIVING?: NO

## 2024-01-02 SDOH — ECONOMIC STABILITY: HOUSING INSECURITY: IN THE PAST 12 MONTHS HAS THE ELECTRIC, GAS, OIL, OR WATER COMPANY THREATENED TO SHUT OFF SERVICES IN YOUR HOME?: NO

## 2024-01-02 SDOH — SOCIAL STABILITY: SOCIAL INSECURITY: ARE YOU OR HAVE YOU BEEN THREATENED OR ABUSED PHYSICALLY, EMOTIONALLY, OR SEXUALLY BY ANYONE?: NO

## 2024-01-02 SDOH — ECONOMIC STABILITY: TRANSPORTATION INSECURITY
IN THE PAST 12 MONTHS, HAS THE LACK OF TRANSPORTATION KEPT YOU FROM MEDICAL APPOINTMENTS OR FROM GETTING MEDICATIONS?: NO

## 2024-01-02 SDOH — ECONOMIC STABILITY: TRANSPORTATION INSECURITY: IN THE PAST 12 MONTHS, HAS LACK OF TRANSPORTATION KEPT YOU FROM MEDICAL APPOINTMENTS OR FROM GETTING MEDICATIONS?: NO

## 2024-01-02 SDOH — SOCIAL STABILITY: SOCIAL INSECURITY: ARE THERE ANY APPARENT SIGNS OF INJURIES/BEHAVIORS THAT COULD BE RELATED TO ABUSE/NEGLECT?: NO

## 2024-01-02 SDOH — SOCIAL STABILITY: SOCIAL INSECURITY: DOES ANYONE TRY TO KEEP YOU FROM HAVING/CONTACTING OTHER FRIENDS OR DOING THINGS OUTSIDE YOUR HOME?: NO

## 2024-01-02 SDOH — ECONOMIC STABILITY: FOOD INSECURITY: WITHIN THE PAST 12 MONTHS, YOU WORRIED THAT YOUR FOOD WOULD RUN OUT BEFORE YOU GOT THE MONEY TO BUY MORE.: NEVER TRUE

## 2024-01-02 ASSESSMENT — COGNITIVE AND FUNCTIONAL STATUS - GENERAL
DAILY ACTIVITIY SCORE: 24
MOBILITY SCORE: 24
MOBILITY SCORE: 24
DAILY ACTIVITIY SCORE: 24
PATIENT BASELINE BEDBOUND: NO

## 2024-01-02 ASSESSMENT — ACTIVITIES OF DAILY LIVING (ADL)
BATHING: INDEPENDENT
GROOMING: INDEPENDENT
HEARING - LEFT EAR: FUNCTIONAL
LACK_OF_TRANSPORTATION: NO
GROOMING: INDEPENDENT
HEARING - LEFT EAR: FUNCTIONAL
JUDGMENT_ADEQUATE_SAFELY_COMPLETE_DAILY_ACTIVITIES: YES
BATHING: INDEPENDENT
JUDGMENT_ADEQUATE_SAFELY_COMPLETE_DAILY_ACTIVITIES: YES
TOILETING: INDEPENDENT
WALKS IN HOME: INDEPENDENT
PATIENT'S MEMORY ADEQUATE TO SAFELY COMPLETE DAILY ACTIVITIES?: YES
HEARING - RIGHT EAR: FUNCTIONAL
FEEDING YOURSELF: INDEPENDENT
HEARING - RIGHT EAR: FUNCTIONAL
DRESSING YOURSELF: INDEPENDENT
LACK_OF_TRANSPORTATION: NO
DRESSING YOURSELF: INDEPENDENT
WALKS IN HOME: INDEPENDENT
PATIENT'S MEMORY ADEQUATE TO SAFELY COMPLETE DAILY ACTIVITIES?: YES
FEEDING YOURSELF: INDEPENDENT
ADEQUATE_TO_COMPLETE_ADL: YES
ADEQUATE_TO_COMPLETE_ADL: YES
LACK_OF_TRANSPORTATION: NO

## 2024-01-02 ASSESSMENT — PAIN SCALES - GENERAL
PAINLEVEL_OUTOF10: 0 - NO PAIN

## 2024-01-02 ASSESSMENT — LIFESTYLE VARIABLES
HOW OFTEN DURING THE LAST YEAR HAVE YOU BEEN UNABLE TO REMEMBER WHAT HAPPENED THE NIGHT BEFORE BECAUSE YOU HAD BEEN DRINKING: NEVER
SKIP TO QUESTIONS 9-10: 1
PRESCIPTION_ABUSE_PAST_12_MONTHS: NO
HAS A RELATIVE, FRIEND, DOCTOR, OR ANOTHER HEALTH PROFESSIONAL EXPRESSED CONCERN ABOUT YOUR DRINKING OR SUGGESTED YOU CUT DOWN: NO
HOW OFTEN DURING THE LAST YEAR HAVE YOU FAILED TO DO WHAT WAS NORMALLY EXPECTED FROM YOU BECAUSE OF DRINKING: NEVER
SUBSTANCE_ABUSE_PAST_12_MONTHS: NO
HOW OFTEN DO YOU HAVE A DRINK CONTAINING ALCOHOL: 2-3 TIMES A WEEK
AUDIT-C TOTAL SCORE: 3
HOW OFTEN DURING THE LAST YEAR HAVE YOU FOUND THAT YOU WERE NOT ABLE TO STOP DRINKING ONCE YOU HAD STARTED: NEVER
AUDIT TOTAL SCORE: 3
AUDIT-C TOTAL SCORE: 3
HOW OFTEN DO YOU HAVE 6 OR MORE DRINKS ON ONE OCCASION: NEVER
HOW OFTEN DURING THE LAST YEAR HAVE YOU NEEDED AN ALCOHOLIC DRINK FIRST THING IN THE MORNING TO GET YOURSELF GOING AFTER A NIGHT OF HEAVY DRINKING: NEVER
HOW MANY STANDARD DRINKS CONTAINING ALCOHOL DO YOU HAVE ON A TYPICAL DAY: 1 OR 2
HAVE YOU OR SOMEONE ELSE BEEN INJURED AS A RESULT OF YOUR DRINKING: NO
HOW OFTEN DURING THE LAST YEAR HAVE YOU HAD A FEELING OF GUILT OR REMORSE AFTER DRINKING: NEVER
AUDIT TOTAL SCORE: 0

## 2024-01-02 ASSESSMENT — PAIN - FUNCTIONAL ASSESSMENT
PAIN_FUNCTIONAL_ASSESSMENT: 0-10

## 2024-01-02 ASSESSMENT — PATIENT HEALTH QUESTIONNAIRE - PHQ9
1. LITTLE INTEREST OR PLEASURE IN DOING THINGS: NOT AT ALL
SUM OF ALL RESPONSES TO PHQ9 QUESTIONS 1 & 2: 0
2. FEELING DOWN, DEPRESSED OR HOPELESS: NOT AT ALL

## 2024-01-02 ASSESSMENT — SOCIAL DETERMINANTS OF HEALTH (SDOH): IN THE PAST 12 MONTHS, HAS THE ELECTRIC, GAS, OIL, OR WATER COMPANY THREATENED TO SHUT OFF SERVICE IN YOUR HOME?: NO

## 2024-01-02 NOTE — ED PROVIDER NOTES
"Chief Complaint   Patient presents with    Blurred Vision     History of Present Illness   Becky Mendiola   MRN [id number]    48-year-old presents for evaluation of suspected multiple sclerosis flare.  Patient describes 5 or 6 days of \"brain fog\" and bilateral vision blurriness without vision loss or double vision.  Vision is unaffected if she closes right eye or left eye or looks with both eyes at the same time.  Typically wears glasses or contacts.  States that she has poor vision especially in the left eye at baseline that she attributes to astigmatism.  Patient reports that she has not seen a neurologist in many years and does not take any medication for multiple sclerosis at this time.  Reports no headache, fever or chills, speech changes, facial droop, extremity weakness or numbness.    External records reviewed including most recent MRI from January 2019 that demonstrated demyelinating changes.     Physical Exam     ED Triage Vitals [01/01/24 1247]   Temp Heart Rate Resp BP   36.9 °C (98.5 °F) 78 17 148/77      SpO2 Temp Source Heart Rate Source Patient Position   98 % Temporal -- --      BP Location FiO2 (%)     -- --         General:  No acute distress.  Head: Atraumatic.  Eyes: No conjunctival injection.  No APD.  Normal extraocular movements.  No eyelid or periorbital swelling.  Ears Nose Throat: Hearing grossly intact. No epistaxis. Oral mucosa moist.  Neck: Supple.  Cardiovascular: Extremities warm.   Pulmonary: No stridor. Normal respiratory effort.  Abdominal: No distention.  Musculoskeletal: No deformity or joint swelling.  Skin: No rash.  Psychiatric: Does not appear to respond to internal stimuli.  Neurologic: Alert. Follows directions. Visual fields intact by finger counting. No nystagmus. Facial sensation to light touch intact. Eyebrows and corners of the mouth elevate symmetrically. Tongue protrudes midline. No aphasia or dysarthria. Strength 5/5 upper and lower extremities. Extremity sensation to " light touch intact. No finger nose dysmetria. Intact rapid alternating hand movements. Ambulates with steady gait.      ED Course & Medical Decision Making   Triage vital signs notable for mildly elevated blood pressure 140/77 otherwise within normal limits.  Patient was well-appearing with no observable neurologic deficits.  Visual acuity 20/30 and 20/50.  CBC demonstrated no leukocytosis anemia or thrombocytopenia.  Unremarkable metabolic panel.  CT head without contrast demonstrated no acute intracranial hemorrhage.  Discussed case with neurologist on-call Dr. Ankush Rogers who recommended MR brain with and without contrast and MR orbits with and without contrast, 1000 mg Solu-Medrol for possible multiple sclerosis flare, and observation admission.    Diagnoses as of 01/03/24 2013   Blurry vision, bilateral   Multiple sclerosis (CMS/HCC)            Clay Handy MD  01/03/24 2015
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
## 2024-01-02 NOTE — CARE PLAN
The patient's goals for the shift include      The clinical goals for the shift include rest      Problem: Pain - Adult  Goal: Verbalizes/displays adequate comfort level or baseline comfort level  Outcome: Progressing     Problem: Safety - Adult  Goal: Free from fall injury  Outcome: Progressing     Problem: Discharge Planning  Goal: Discharge to home or other facility with appropriate resources  Outcome: Progressing     Problem: Chronic Conditions and Co-morbidities  Goal: Patient's chronic conditions and co-morbidity symptoms are monitored and maintained or improved  Outcome: Progressing     Problem: Fall/Injury  Goal: Not fall by end of shift  Outcome: Progressing  Goal: Be free from injury by end of the shift  Outcome: Progressing  Goal: Verbalize understanding of personal risk factors for fall in the hospital  Outcome: Progressing  Goal: Verbalize understanding of risk factor reduction measures to prevent injury from fall in the home  Outcome: Progressing  Goal: Use assistive devices by end of the shift  Outcome: Progressing  Goal: Pace activities to prevent fatigue by end of the shift  Outcome: Progressing   .

## 2024-01-02 NOTE — PROGRESS NOTES
01/02/24 1033   Current Planned Discharge Disposition   Current Planned Discharge Disposition Home

## 2024-01-02 NOTE — CARE PLAN
The patient's goals for the shift include      The clinical goals for the shift include rest      Problem: Pain - Adult  Goal: Verbalizes/displays adequate comfort level or baseline comfort level  Outcome: Progressing     Problem: Safety - Adult  Goal: Free from fall injury  Outcome: Progressing     Problem: Discharge Planning  Goal: Discharge to home or other facility with appropriate resources  Outcome: Progressing     Problem: Chronic Conditions and Co-morbidities  Goal: Patient's chronic conditions and co-morbidity symptoms are monitored and maintained or improved  Outcome: Progressing     Problem: Fall/Injury  Goal: Not fall by end of shift  Outcome: Progressing  Goal: Be free from injury by end of the shift  Outcome: Progressing  Goal: Verbalize understanding of personal risk factors for fall in the hospital  Outcome: Progressing  Goal: Verbalize understanding of risk factor reduction measures to prevent injury from fall in the home  Outcome: Progressing  Goal: Use assistive devices by end of the shift  Outcome: Progressing  Goal: Pace activities to prevent fatigue by end of the shift  Outcome: Progressing

## 2024-01-02 NOTE — PROGRESS NOTES
01/02/24 1032   Discharge Planning   Living Arrangements Spouse/significant other   Support Systems Spouse/significant other   Assistance Needed No home going needs identified   Type of Residence Private residence   Home or Post Acute Services None   Patient expects to be discharged to: Home   Does the patient need discharge transport arranged? Yes   RoundTrip coordination needed? Yes   Financial Resource Strain   How hard is it for you to pay for the very basics like food, housing, medical care, and heating? Not hard   Housing Stability   In the last 12 months, was there a time when you were not able to pay the mortgage or rent on time? N   In the last 12 months, how many places have you lived? 1   In the last 12 months, was there a time when you did not have a steady place to sleep or slept in a shelter (including now)? N   Transportation Needs   In the past 12 months, has lack of transportation kept you from medical appointments or from getting medications? no   In the past 12 months, has lack of transportation kept you from meetings, work, or from getting things needed for daily living? No     Met with patient at bedside to  discuss her preferences for care upon discharge. Discussed how patient manages health at home. Lives in a house with her  .  Independent in all ADLs.  No home O2, dialysis, or assistive devices.  No additional resources or needs identified. Reviewed today's plan of care.  Patient verbalized understanding as evidenced by teach back method. Patient plans to return home at discharge & follow up with her PCP.   will provide transportation home upon discharge.  MRI and neuro consult pending.    [facility]radeN RN [facility]

## 2024-01-02 NOTE — H&P
History Of Present Illness  Becky Mendiola is a 48 y.o. female presenting with blurred vision.  Patient states for 5 days she was trying to read her computer and noticed her vision was very blurred.  Even with her glasses it did not get any better.  This started shortly after Jenna.  She has a history of MS but has not had any issues with her MS for several years.  She was diagnosed 15 years ago.  Was taking treatments up until about 5 years ago.  Stopped seeing her neurologist and has not had any problems until now.  Patient states this is very different from any other symptoms she has had in the past.  She has never had any issues with her vision.  She denies any headache.  No nausea or vomiting.  She states after about 5 days when it was not getting any better she thought she should come in.  Patient denies any headaches, nausea, vomiting, fevers.  Neurology saw her in the ER and wanted an MRI and high-dose steroids.  She was given Solu-Medrol 1000 mg IV and is supposed to get that for the next 3 days once every 24 hours.  Patient does states she was sick a couple of weeks ago to her primary care doctor they did a chest x-ray which did not show any abnormal findings and she was put on antibiotics.  She states she is feeling much better now after the course of treatment.     Past Medical History  She has a past medical history of Anxiety disorder, unspecified (12/09/2013), Other conditions influencing health status (12/09/2013), and Personal history of other mental and behavioral disorders (12/09/2013).  Multiple sclerosis, hypertension, migraines, insomnia    Surgical History  She has no past surgical history on file.     Social History  She reports that she has quit smoking. Her smoking use included cigarettes. She has never used smokeless tobacco. No history on file for alcohol use and drug use.    Family History  Family History   Problem Relation Name Age of Onset    Hypertension Mother      Hypertension  Father      Thyroid cancer Father      Multiple sclerosis Other Family Hx         Allergies  Amoxicillin and Sulfa (sulfonamide antibiotics)    Review of Systems   Gen: No fatigue, fever, sweats.  Head: No headache, trauma.  Eyes: No vision loss, double vision, drainage, eye pain. POSITIVE blurred vision  ENT: No hearing changes, pain, epistaxis, congestion  Cardiac: No chest pain  Pulmonary: No shortness of breath,  pleuritic pain,   Heme/lymph: No swollen glands  GI: No abdominal pain, nausea, vomiting, diarrhea  : No  dysuria, frequency, urgency, hematuria  Musculoskeletal: No limb pain, joint pain, back pain, joint swelling or stiffness.  Skin: No rashes, pruritus, lumps, lesions.  Neuro: No Numbness, tingling, or weakness.  Psych: No  anxiety     Review of systems is otherwise negative unless stated above or in history of present illness.    Physical Exam   General: Vital signs stable, Pt is alert, no acute distress  Eyes: Conjunctiva normal, PERRL, EOMs intact  HENMT: Normocephalic, atraumatic, external ears and nose normal, no scars or masses.  No mastoid tenderness. Trachea is midline. No meningeal signs, negative Kernig and Brudzinski, moves neck freely.  No sinus tenderness  Resp: Respiratory effort is normal, no retractions, no stridor. Lungs CTA, no wheezes or rhonchi  CV: Heart is regular rate and rhythm.   Skin: No evidence of trauma, skin is warm and dry. No rashes, lesions or ulcers.  Skel: full range of motion of upper and lower extremities.   Neuro: Normal gait, CN II-XII intact, no motor or sensory changes.  Psych: Alert and oriented ×3, judgment is appropriate, normal mood and affect     Last Recorded Vitals  /65 (Patient Position: Sitting)   Pulse 83   Temp 35.9 °C (96.6 °F) (Tympanic)   Resp 16   Wt 86.2 kg (190 lb)   SpO2 96%     Relevant Results        Results for orders placed or performed during the hospital encounter of 01/01/24 (from the past 24 hour(s))   CBC and Auto  Differential   Result Value Ref Range    WBC 7.2 4.4 - 11.3 x10*3/uL    nRBC 0.0 0.0 - 0.0 /100 WBCs    RBC 4.68 4.00 - 5.20 x10*6/uL    Hemoglobin 14.3 12.0 - 16.0 g/dL    Hematocrit 43.3 36.0 - 46.0 %    MCV 93 80 - 100 fL    MCH 30.6 26.0 - 34.0 pg    MCHC 33.0 32.0 - 36.0 g/dL    RDW 12.3 11.5 - 14.5 %    Platelets 205 150 - 450 x10*3/uL    Neutrophils % 58.0 40.0 - 80.0 %    Immature Granulocytes %, Automated 0.7 0.0 - 0.9 %    Lymphocytes % 29.3 13.0 - 44.0 %    Monocytes % 8.8 2.0 - 10.0 %    Eosinophils % 2.6 0.0 - 6.0 %    Basophils % 0.6 0.0 - 2.0 %    Neutrophils Absolute 4.17 1.20 - 7.70 x10*3/uL    Immature Granulocytes Absolute, Automated 0.05 0.00 - 0.70 x10*3/uL    Lymphocytes Absolute 2.11 1.20 - 4.80 x10*3/uL    Monocytes Absolute 0.63 0.10 - 1.00 x10*3/uL    Eosinophils Absolute 0.19 0.00 - 0.70 x10*3/uL    Basophils Absolute 0.04 0.00 - 0.10 x10*3/uL   Comprehensive metabolic panel   Result Value Ref Range    Glucose 92 74 - 99 mg/dL    Sodium 139 136 - 145 mmol/L    Potassium 3.9 3.5 - 5.3 mmol/L    Chloride 105 98 - 107 mmol/L    Bicarbonate 23 21 - 32 mmol/L    Anion Gap 15 10 - 20 mmol/L    Urea Nitrogen 14 6 - 23 mg/dL    Creatinine 0.83 0.50 - 1.05 mg/dL    eGFR 87 >60 mL/min/1.73m*2    Calcium 9.1 8.6 - 10.3 mg/dL    Albumin 4.4 3.4 - 5.0 g/dL    Alkaline Phosphatase 71 33 - 110 U/L    Total Protein 7.0 6.4 - 8.2 g/dL    AST 27 9 - 39 U/L    Bilirubin, Total 0.7 0.0 - 1.2 mg/dL    ALT 42 7 - 45 U/L   CBC   Result Value Ref Range    WBC 7.3 4.4 - 11.3 x10*3/uL    nRBC 0.0 0.0 - 0.0 /100 WBCs    RBC 4.65 4.00 - 5.20 x10*6/uL    Hemoglobin 14.5 12.0 - 16.0 g/dL    Hematocrit 42.5 36.0 - 46.0 %    MCV 91 80 - 100 fL    MCH 31.2 26.0 - 34.0 pg    MCHC 34.1 32.0 - 36.0 g/dL    RDW 12.1 11.5 - 14.5 %    Platelets 198 150 - 450 x10*3/uL   Basic Metabolic Panel   Result Value Ref Range    Glucose 180 (H) 74 - 99 mg/dL    Sodium 135 (L) 136 - 145 mmol/L    Potassium 4.0 3.5 - 5.3 mmol/L    Chloride  104 98 - 107 mmol/L    Bicarbonate 22 21 - 32 mmol/L    Anion Gap 13 10 - 20 mmol/L    Urea Nitrogen 17 6 - 23 mg/dL    Creatinine 0.69 0.50 - 1.05 mg/dL    eGFR >90 >60 mL/min/1.73m*2    Calcium 9.1 8.6 - 10.3 mg/dL     CT head wo IV contrast    Result Date: 1/1/2024  Interpreted By:  Eduar Hilario, STUDY: CT HEAD WO IV CONTRAST;  1/1/2024 4:50 pm   INDICATION: Signs/Symptoms:MS flare, blurry vision.   COMPARISON: None.   ACCESSION NUMBER(S): [id number]   ORDERING CLINICIAN: SOCORRO PALACIOS   TECHNIQUE: Noncontrast axial CT images of head were obtained with coronal and sagittal reconstructed images.   FINDINGS: BRAIN PARENCHYMA:  No acute intraparenchymal hemorrhage or parenchymal evidence of acute large territory ischemic infarct. No mass-effect. Gray-white matter distinction is preserved.   VENTRICLES and EXTRA-AXIAL SPACES:  No acute extra-axial or intraventricular hemorrhage. No effacement of cerebral sulci. Ventricles and sulci are age-concordant.   PARANASAL SINUSES/MASTOIDS:  No hemorrhage or air-fluid levels within the visualized paranasal sinuses. The mastoids are well aerated.   CALVARIUM/ORBITS:  No skull fracture.  The orbits and globes are intact to the extent visualized.   EXTRACRANIAL SOFT TISSUES: No discernible abnormality.       1. No acute intracranial abnormality.         MACRO: None.   Signed by: Eduar Hilairo 1/1/2024 5:38 PM Dictation workstation:   [id number]         Assessment/Plan   Principal Problem:    Multiple sclerosis (CMS/HCC)    Blurred vision  -CBC wnl  -CMP Na+ 135,   -CT head no intracranial abnormalities  -Solumedrol 1000 mg ivpb q24h x3 days  -neurology consult  -neuro checks  -MRI brain and orbits pending    DVT prophylaxis  -ambulate    Labs/Testing reviewed    Interdisciplinary team rounding completed with hospitalist, nurse, TCC    NP discussed plan and lab/testing results with Dr. Vyas    Mri pending  Neurology to follow  Solumedrol ivpb x3 days    * 45  minutes total spent on patient's care today; >50% time spent on counseling/coordination of care         Maureen Brown, APRN-CNP

## 2024-01-03 VITALS
DIASTOLIC BLOOD PRESSURE: 70 MMHG | WEIGHT: 190 LBS | HEIGHT: 67 IN | RESPIRATION RATE: 18 BRPM | TEMPERATURE: 98.4 F | OXYGEN SATURATION: 98 % | BODY MASS INDEX: 29.82 KG/M2 | HEART RATE: 92 BPM | SYSTOLIC BLOOD PRESSURE: 117 MMHG

## 2024-01-03 PROBLEM — H46.9 RIGHT OPTIC NEURITIS: Status: ACTIVE | Noted: 2024-01-03

## 2024-01-03 LAB
ANION GAP SERPL CALC-SCNC: 15 MMOL/L (ref 10–20)
BASOPHILS # BLD AUTO: 0.02 X10*3/UL (ref 0–0.1)
BASOPHILS NFR BLD AUTO: 0.1 %
BUN SERPL-MCNC: 22 MG/DL (ref 6–23)
CALCIUM SERPL-MCNC: 9 MG/DL (ref 8.6–10.3)
CHLORIDE SERPL-SCNC: 105 MMOL/L (ref 98–107)
CO2 SERPL-SCNC: 22 MMOL/L (ref 21–32)
CREAT SERPL-MCNC: 0.7 MG/DL (ref 0.5–1.05)
EOSINOPHIL # BLD AUTO: 0 X10*3/UL (ref 0–0.7)
EOSINOPHIL NFR BLD AUTO: 0 %
ERYTHROCYTE [DISTWIDTH] IN BLOOD BY AUTOMATED COUNT: 12.3 % (ref 11.5–14.5)
GFR SERPL CREATININE-BSD FRML MDRD: >90 ML/MIN/1.73M*2
GLUCOSE SERPL-MCNC: 177 MG/DL (ref 74–99)
HCT VFR BLD AUTO: 42.2 % (ref 36–46)
HGB BLD-MCNC: 13.8 G/DL (ref 12–16)
IMM GRANULOCYTES # BLD AUTO: 0.14 X10*3/UL (ref 0–0.7)
IMM GRANULOCYTES NFR BLD AUTO: 0.9 % (ref 0–0.9)
LYMPHOCYTES # BLD AUTO: 1.04 X10*3/UL (ref 1.2–4.8)
LYMPHOCYTES NFR BLD AUTO: 6.6 %
MCH RBC QN AUTO: 30.7 PG (ref 26–34)
MCHC RBC AUTO-ENTMCNC: 32.7 G/DL (ref 32–36)
MCV RBC AUTO: 94 FL (ref 80–100)
MONOCYTES # BLD AUTO: 0.19 X10*3/UL (ref 0.1–1)
MONOCYTES NFR BLD AUTO: 1.2 %
NEUTROPHILS # BLD AUTO: 14.29 X10*3/UL (ref 1.2–7.7)
NEUTROPHILS NFR BLD AUTO: 91.2 %
NRBC BLD-RTO: 0 /100 WBCS (ref 0–0)
PLATELET # BLD AUTO: 225 X10*3/UL (ref 150–450)
POTASSIUM SERPL-SCNC: 4.2 MMOL/L (ref 3.5–5.3)
RBC # BLD AUTO: 4.49 X10*6/UL (ref 4–5.2)
SODIUM SERPL-SCNC: 138 MMOL/L (ref 136–145)
WBC # BLD AUTO: 15.7 X10*3/UL (ref 4.4–11.3)

## 2024-01-03 PROCEDURE — 2500000004 HC RX 250 GENERAL PHARMACY W/ HCPCS (ALT 636 FOR OP/ED): Performed by: PSYCHIATRY & NEUROLOGY

## 2024-01-03 PROCEDURE — G0378 HOSPITAL OBSERVATION PER HR: HCPCS

## 2024-01-03 PROCEDURE — 85025 COMPLETE CBC W/AUTO DIFF WBC: CPT | Performed by: NURSE PRACTITIONER

## 2024-01-03 PROCEDURE — 99231 SBSQ HOSP IP/OBS SF/LOW 25: CPT | Performed by: NURSE PRACTITIONER

## 2024-01-03 PROCEDURE — 2500000001 HC RX 250 WO HCPCS SELF ADMINISTERED DRUGS (ALT 637 FOR MEDICARE OP): Performed by: HOSPITALIST

## 2024-01-03 PROCEDURE — 99231 SBSQ HOSP IP/OBS SF/LOW 25: CPT | Performed by: PSYCHIATRY & NEUROLOGY

## 2024-01-03 PROCEDURE — 36415 COLL VENOUS BLD VENIPUNCTURE: CPT | Performed by: NURSE PRACTITIONER

## 2024-01-03 PROCEDURE — 80048 BASIC METABOLIC PNL TOTAL CA: CPT | Performed by: NURSE PRACTITIONER

## 2024-01-03 PROCEDURE — 2500000004 HC RX 250 GENERAL PHARMACY W/ HCPCS (ALT 636 FOR OP/ED): Performed by: HOSPITALIST

## 2024-01-03 RX ORDER — PREDNISONE 20 MG/1
40 TABLET ORAL DAILY
Status: DISCONTINUED | OUTPATIENT
Start: 2024-01-07 | End: 2024-01-03 | Stop reason: HOSPADM

## 2024-01-03 RX ORDER — PREDNISONE 20 MG/1
60 TABLET ORAL DAILY
Qty: 9 TABLET | Refills: 0 | Status: SHIPPED | OUTPATIENT
Start: 2024-01-04 | End: 2024-01-07

## 2024-01-03 RX ORDER — PREDNISONE 20 MG/1
60 TABLET ORAL DAILY
Status: DISCONTINUED | OUTPATIENT
Start: 2024-01-03 | End: 2024-01-03 | Stop reason: HOSPADM

## 2024-01-03 RX ORDER — PREDNISONE 20 MG/1
30 TABLET ORAL DAILY
Status: DISCONTINUED | OUTPATIENT
Start: 2024-01-08 | End: 2024-01-03 | Stop reason: HOSPADM

## 2024-01-03 RX ORDER — PREDNISONE 20 MG/1
20 TABLET ORAL DAILY
Status: DISCONTINUED | OUTPATIENT
Start: 2024-01-09 | End: 2024-01-03 | Stop reason: HOSPADM

## 2024-01-03 RX ADMIN — LISINOPRIL 20 MG: 20 TABLET ORAL at 08:55

## 2024-01-03 RX ADMIN — PREDNISONE 60 MG: 20 TABLET ORAL at 11:55

## 2024-01-03 RX ADMIN — POLYETHYLENE GLYCOL 3350 17 G: 17 POWDER, FOR SOLUTION ORAL at 08:55

## 2024-01-03 ASSESSMENT — COGNITIVE AND FUNCTIONAL STATUS - GENERAL
DAILY ACTIVITIY SCORE: 24
MOBILITY SCORE: 24

## 2024-01-03 ASSESSMENT — PAIN SCALES - GENERAL: PAINLEVEL_OUTOF10: 0 - NO PAIN

## 2024-01-03 ASSESSMENT — PAIN - FUNCTIONAL ASSESSMENT
PAIN_FUNCTIONAL_ASSESSMENT: 0-10
PAIN_FUNCTIONAL_ASSESSMENT: 0-10

## 2024-01-03 NOTE — CARE PLAN
The clinical goals for the shift include rest      Problem: Pain - Adult  Goal: Verbalizes/displays adequate comfort level or baseline comfort level  Outcome: Progressing     Problem: Safety - Adult  Goal: Free from fall injury  Outcome: Progressing

## 2024-01-03 NOTE — DISCHARGE SUMMARY
Discharge Diagnosis  Multiple sclerosis (CMS/[facility])  Multiple sclerosis exacerbation characterized by right optic neuritis.     Discharge Meds     Your medication list        ASK your doctor about these medications        Instructions Last Dose Given Next Dose Due   albuterol 90 mcg/actuation inhaler  Commonly known as: ProAir HFA      Inhale 2 puffs every 4 hours if needed for wheezing or shortness of breath.       benzonatate 200 mg capsule  Commonly known as: Tessalon      Take 1 capsule (200 mg) by mouth 3 times a day as needed for cough. Do not crush or chew.       ergocalciferol 1.25 MG (35094 UT) capsule  Commonly known as: Vitamin D-2      TAKE 1 CAPSULE (1,250 MCG) BY MOUTH 1 (ONE) TIME PER WEEK.       lisinopril 20 mg tablet      Take 1 tablet (20 mg) by mouth once daily.       methylPREDNISolone 4 mg tablets  Commonly known as: Medrol Dospak      Take as directed on package.       naproxen 500 mg tablet  Commonly known as: Naprosyn      Take 1 tablet (500 mg) by mouth 2 times a day as needed for mild pain (1 - 3) (pain).       traZODone 150 mg tablet  Commonly known as: Desyrel      Take 1 tablet (150 mg) by mouth once daily at bedtime.       ubrogepant 100 mg tablet tablet  Commonly known as: Ubrelvy      Take 1 tablet (100 mg) by mouth if needed (Migraines) for up to 1 dose.                Test Results Pending At Discharge  Pending Labs       No current pending labs.            Hospital Course  Becky Mendiola is a 48 y.o. female presenting with blurred vision.  Patient states for 5 days she was trying to read her computer and noticed her vision was very blurred.  Even with her glasses it did not get any better.  This started shortly after Jenna.  She has a history of MS but has not had any issues with her MS for several years.  She was diagnosed 15 years ago.  Was taking treatments up until about 5 years ago.  Stopped seeing her neurologist and has not had any problems until now.  Patient states this  is very different from any other symptoms she has had in the past.  She has never had any issues with her vision.  She denies any headache.  No nausea or vomiting.  She states after about 5 days when it was not getting any better she thought she should come in.  Patient denies any headaches, nausea, vomiting, fevers.  Neurology saw her in the ER and wanted an MRI and high-dose steroids.  She was given Solu-Medrol 1000 mg IV and is supposed to get that for the next 3 days once every 24 hours.  Patient does states she was sick a couple of weeks ago to her primary care doctor they did a chest x-ray which did not show any abnormal findings and she was put on antibiotics.  She states she is feeling much better now after the course of treatment.     - seen by neurology and rec mri with and without. Mri notable for right optic neuritis. Treated with iv high dose steroids. Plan to dc on oral steroid taper and fu with ms sub specialist next week.     Pertinent Physical Exam At Time of Discharge  Physical Exam  General: Vital signs stable, Pt is alert, no acute distress  Eyes: Conjunctiva normal, PERRL, EOMs intact  HENMT: Normocephalic, atraumatic, external ears and nose normal, no scars or masses.  No mastoid tenderness. Trachea is midline. No meningeal signs, negative Kernig and Brudzinski, moves neck freely.  No sinus tenderness  Resp: Respiratory effort is normal, no retractions, no stridor. Lungs CTA, no wheezes or rhonchi  CV: Heart is regular rate and rhythm.   Skin: No evidence of trauma, skin is warm and dry. No rashes, lesions or ulcers.  Skel: full range of motion of upper and lower extremities.   Neuro: Normal gait, CN II-XII intact, no motor or sensory changes.  Psych: Alert and oriented ×3, judgment is appropriate, normal mood and affect        Outpatient Follow-Up  Future Appointments   Date Time Provider Department [place]   1/8/2024  8:45 AM MELODY Martínez-CNP [id number] [facility]   1/22/2024  9:20 AM  Rusty Elias MD [id number] [facility]   1/29/2024  8:50 AM Rusty Elias MD [id number] [facility]   2/5/2024  8:10 AM Rusty Elias MD [id number] [facility]     Fuw with pcp and ms specialist     Pily Allan, APRN-CNP

## 2024-01-03 NOTE — PROGRESS NOTES
Discussed patient during IDR- neuro following and will need to medically clear patient for discharge.  Patient currently on 1 g of solumedrol every 24 hours.  Patient will transition to prednisone taper.  Plan remains for patient to return home with no needs.  Will continue to follow for discharge planning needs.

## 2024-01-03 NOTE — CARE PLAN
Problem: Safety - Adult  Goal: Free from fall injury  1/2/2024 2140 by Monalisa Wright, RN  Outcome: Progressing  1/2/2024 2134 by Monalisa Wright, RN  Outcome: Progressing   The patient's goals for the shift include      The clinical goals for the shift include Patient will be free from injury and HDS throughout shift

## 2024-01-03 NOTE — PROGRESS NOTES
"Becky Mendiola is a 48 y.o. female on day 0 of admission presenting with Multiple sclerosis (CMS/[facility]).      Subjective     She indicates vision is subjectively about the same as yesterday.  She tolerated the second dose of Solu-Medrol last night at around 9 PM without significant side effects.  She is insistent that she needs to leave the hospital because of a family obligation.  She does have an outpatient appointment with neurology lined up for 1/8.       Objective     Last Recorded Vitals  Blood pressure 109/71, pulse 69, temperature 36.7 °C (98.1 °F), temperature source Temporal, resp. rate 18, height 1.702 m (5' 7\"), weight 86.2 kg (190 lb), SpO2 97 %.    Physical Exam  Neurological Exam    Clear sensorium without fluctuation.  Appropriate in conversation.  OD monocular visual fields full to confrontation.  No gaze deviation or ptosis.  No facial motor asymmetry.  Grossly intact hearing.  No dysarthria.      Relevant Results    Contrasted brain and orbit MRI is reviewed and notable for subtle features which however are supportive of right optic neuritis.  No significant change in cerebral white matter lesion pattern and no enhancing cerebral lesions.                                     Assessment/Plan      Multiple sclerosis exacerbation characterized by right optic neuritis.    Although she notes no marked improvement after 2 doses of Solu-Medrol, she is fairly insistent on leaving today due to a family obligation.    She does have an appointment lined up to see her MS subspecialist again in less than a week.    Accordingly recommend prednisone taper with first dose today, which I will order.    She appears neurologically stable for discharge today.               Ankush Rogers MD  "

## 2024-01-08 ENCOUNTER — OFFICE VISIT (OUTPATIENT)
Dept: NEUROLOGY | Facility: CLINIC | Age: 49
End: 2024-01-08
Payer: COMMERCIAL

## 2024-01-08 VITALS
DIASTOLIC BLOOD PRESSURE: 69 MMHG | WEIGHT: 206 LBS | SYSTOLIC BLOOD PRESSURE: 113 MMHG | RESPIRATION RATE: 18 BRPM | HEART RATE: 80 BPM | BODY MASS INDEX: 32.26 KG/M2

## 2024-01-08 DIAGNOSIS — G35 MULTIPLE SCLEROSIS (MULTI): Primary | ICD-10-CM

## 2024-01-08 PROCEDURE — 99215 OFFICE O/P EST HI 40 MIN: CPT | Performed by: NURSE PRACTITIONER

## 2024-01-08 PROCEDURE — 3078F DIAST BP <80 MM HG: CPT | Performed by: NURSE PRACTITIONER

## 2024-01-08 PROCEDURE — 1036F TOBACCO NON-USER: CPT | Performed by: NURSE PRACTITIONER

## 2024-01-08 PROCEDURE — 3074F SYST BP LT 130 MM HG: CPT | Performed by: NURSE PRACTITIONER

## 2024-01-08 ASSESSMENT — PAIN SCALES - GENERAL: PAINLEVEL: 0-NO PAIN

## 2024-01-08 ASSESSMENT — VISUAL ACUITY: VA_NORMAL: 1

## 2024-01-08 NOTE — PROGRESS NOTES
Patient name: Becky Mendiola  Onset: ?2008  Diagnosis of MS: 2008 by MRI and LP (Dr Hough)  Disease course at onset: RR  Current disease course: RR  Previous disease therapies:   - Copaxone briefly  - Rebif stopped in 2017 because she ran out  Current disease therapies: none  Most recent MRI brain: 1/2019 stable  Most recent MRI cervical spine: 8/2016, mild consistent with MS stable vs 2014  Most recent MRI thoracic spine: 8/2016, ?T9 cord lesion  CSF: remote reportedly pos for OCB  JCV serology and date: none      Subjective   Becky Mendiola is a 48 y.o.   female here for a follow up of her MS, she is not on a DMT because she reports being stable for many years. She started having blurred vision 1/2024 and went to ED where she was admitted and received MRI of brain and orbits which showed enhancement right pre chiasmatic optic nerve with enhancement extending towards and potentially minimally involving anterior aspect of the optic chiasm.  She was given 2 rounds of IVMP and given prednisone taper for home.  She states they wanted to give her another round IV, but she had to go home.    She states her right eye still has some blurred vision and she sees a white light shining all day. She finished her prednisone taper, but still reports blurred vision.    ROS  No blurred vision, no recent infections, stable gait, and no trouble swallowing.    Objective   Neurological Exam  Mental Status  Alert. Oriented to person, place, time and situation. Oriented to person, place, and time. Recent and remote memory are intact. Speech is normal. Language is fluent with no aphasia. Attention and concentration are normal.    Cranial Nerves  CN I: Sense of smell is normal.  CN II: Visual acuity is normal. Visual fields full to confrontation.  CN III, IV, VI: Extraocular movements intact bilaterally. Normal lids and orbits bilaterally. Pupils equal round and reactive to light bilaterally.  CN V: Facial sensation is normal.  CN VII: Full  and symmetric facial movement.  CN VIII: Hearing is normal.  CN IX, X: Palate elevates symmetrically  CN XI: Shoulder shrug strength is normal.  CN XII: Tongue midline without atrophy or fasciculations.  Reports blurred vision in right eye with constant flash of white light..    Motor  Normal muscle bulk throughout. Normal muscle tone. Strength is 5/5 throughout all four extremities.    Sensory  Light touch is normal in upper and lower extremities.     Reflexes                                            Right                      Left  Brachioradialis                    2+                         2+  Biceps                                 2+                         2+  Triceps                                2+                         2+  Patellar                                1+                         2+   Right palmar grasp present. Left palmar grasp present.    Coordination    Finger-to-nose, rapid alternating movements and heel-to-shin normal bilaterally without dysmetria.  9 hole peg test: Right hand 21.3 sec. Left hand 21.7 sec..    Gait  Casual gait is normal including stance, stride, and arm swing. Timed get up and go test: 7 seconds.    Physical Exam  Constitutional:       Appearance: Normal appearance.   HENT:      Head: Normocephalic.      Right Ear: Tympanic membrane normal.      Left Ear: Tympanic membrane normal.      Nose: Nose normal.      Mouth/Throat:      Mouth: Mucous membranes are dry.   Eyes:      General: Lids are normal.      Extraocular Movements: Extraocular movements intact.      Pupils: Pupils are equal, round, and reactive to light.   Cardiovascular:      Rate and Rhythm: Normal rate.   Pulmonary:      Effort: Pulmonary effort is normal.   Musculoskeletal:         General: Normal range of motion.      Cervical back: Full passive range of motion without pain and normal range of motion.   Skin:     General: Skin is warm and dry.   Neurological:      Mental Status: She is alert and  oriented to person, place, and time.      Motor: Motor strength is normal.     Coordination: Coordination is intact.      Deep Tendon Reflexes:      Reflex Scores:       Tricep reflexes are 2+ on the right side and 2+ on the left side.       Bicep reflexes are 2+ on the right side and 2+ on the left side.       Brachioradialis reflexes are 2+ on the right side and 2+ on the left side.       Patellar reflexes are 1+ on the right side and 2+ on the left side.  Psychiatric:         Mood and Affect: Mood normal.         Speech: Speech normal.         Behavior: Behavior is cooperative.         Cognition and Memory: Cognition normal.     Provider Impression  Becky Mendiola is a 48 y.o.   female here for a follow up of her MS, she is not on a DMT because she reports being stable for many years. She started having blurred vision 1/2024 and went to ED and was admitted for IVMP. She finished prednisone taper yesterday and still has blurred vision with white light.    We discussed the importance of starting on a DMT.  We discussed several options she would like to read up on Zeposia and Kesimpta due to dosing and s/e. Gave patient pamphlet on both DMTs and she will call once she decides.    We discussed the importance of living healthy life style with diet and exercise and the importance of V-D in patient's with MS.    The total face to face appointment was 45 minutes and more than 50% of the visit was spent counseling and coordination of care.    I personally reviewed laboratory, radiographic, and medical studies which were pertinent for today's visit.    Plan  - Referral to neuro-ophthalmology.  - Read pamphlets on Zeposia and Kesimpta.  - We will order labs once you have decided on DMT.  - Follow up 6 months

## 2024-01-10 DIAGNOSIS — D84.821 IMMUNODEFICIENCY DUE TO DRUG THERAPY (MULTI): Primary | ICD-10-CM

## 2024-01-10 DIAGNOSIS — Z11.59 NEED FOR HEPATITIS B SCREENING TEST: ICD-10-CM

## 2024-01-10 DIAGNOSIS — Z79.899 IMMUNODEFICIENCY DUE TO DRUG THERAPY (MULTI): Primary | ICD-10-CM

## 2024-01-10 DIAGNOSIS — Z00.00 ROUTINE HEALTH MAINTENANCE: ICD-10-CM

## 2024-01-15 ENCOUNTER — SPECIALTY PHARMACY (OUTPATIENT)
Dept: PHARMACY | Facility: CLINIC | Age: 49
End: 2024-01-15

## 2024-01-22 ENCOUNTER — APPOINTMENT (OUTPATIENT)
Dept: OPHTHALMOLOGY | Facility: CLINIC | Age: 49
End: 2024-01-22
Payer: COMMERCIAL

## 2024-01-22 ENCOUNTER — APPOINTMENT (OUTPATIENT)
Dept: ORTHOPEDIC SURGERY | Facility: CLINIC | Age: 49
End: 2024-01-22
Payer: COMMERCIAL

## 2024-01-29 ENCOUNTER — LAB (OUTPATIENT)
Dept: LAB | Facility: LAB | Age: 49
End: 2024-01-29
Payer: COMMERCIAL

## 2024-01-29 ENCOUNTER — OFFICE VISIT (OUTPATIENT)
Dept: ORTHOPEDIC SURGERY | Facility: CLINIC | Age: 49
End: 2024-01-29
Payer: COMMERCIAL

## 2024-01-29 VITALS — HEIGHT: 67 IN | WEIGHT: 206 LBS | BODY MASS INDEX: 32.33 KG/M2

## 2024-01-29 DIAGNOSIS — M17.12 OSTEOARTHRITIS OF LEFT PATELLOFEMORAL JOINT: Primary | ICD-10-CM

## 2024-01-29 DIAGNOSIS — Z11.59 NEED FOR HEPATITIS B SCREENING TEST: ICD-10-CM

## 2024-01-29 DIAGNOSIS — Z00.00 ROUTINE HEALTH MAINTENANCE: ICD-10-CM

## 2024-01-29 DIAGNOSIS — D84.821 IMMUNODEFICIENCY DUE TO DRUG THERAPY (MULTI): ICD-10-CM

## 2024-01-29 DIAGNOSIS — Z79.899 IMMUNODEFICIENCY DUE TO DRUG THERAPY (MULTI): ICD-10-CM

## 2024-01-29 LAB
HBV CORE AB SER QL: NONREACTIVE
HBV SURFACE AB SER-ACNC: >1000 MIU/ML
HBV SURFACE AG SERPL QL IA: NONREACTIVE
IGA SERPL-MCNC: 208 MG/DL (ref 70–400)
IGG SERPL-MCNC: 879 MG/DL (ref 700–1600)
IGM SERPL-MCNC: 135 MG/DL (ref 40–230)
VARICELLA ZOSTER IGG INDEX: 1.9 IA
VZV IGG SER QL IA: POSITIVE

## 2024-01-29 PROCEDURE — 87340 HEPATITIS B SURFACE AG IA: CPT

## 2024-01-29 PROCEDURE — 1036F TOBACCO NON-USER: CPT | Performed by: STUDENT IN AN ORGANIZED HEALTH CARE EDUCATION/TRAINING PROGRAM

## 2024-01-29 PROCEDURE — 86481 TB AG RESPONSE T-CELL SUSP: CPT

## 2024-01-29 PROCEDURE — 20610 DRAIN/INJ JOINT/BURSA W/O US: CPT | Performed by: STUDENT IN AN ORGANIZED HEALTH CARE EDUCATION/TRAINING PROGRAM

## 2024-01-29 PROCEDURE — 86706 HEP B SURFACE ANTIBODY: CPT

## 2024-01-29 PROCEDURE — 86704 HEP B CORE ANTIBODY TOTAL: CPT

## 2024-01-29 PROCEDURE — 36415 COLL VENOUS BLD VENIPUNCTURE: CPT

## 2024-01-29 PROCEDURE — 82784 ASSAY IGA/IGD/IGG/IGM EACH: CPT

## 2024-01-29 PROCEDURE — 86787 VARICELLA-ZOSTER ANTIBODY: CPT

## 2024-01-29 PROCEDURE — 99213 OFFICE O/P EST LOW 20 MIN: CPT | Performed by: STUDENT IN AN ORGANIZED HEALTH CARE EDUCATION/TRAINING PROGRAM

## 2024-01-29 ASSESSMENT — PAIN - FUNCTIONAL ASSESSMENT: PAIN_FUNCTIONAL_ASSESSMENT: NO/DENIES PAIN
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
## 2024-01-31 ENCOUNTER — OFFICE VISIT (OUTPATIENT)
Dept: OPHTHALMOLOGY | Facility: CLINIC | Age: 49
End: 2024-01-31
Payer: COMMERCIAL

## 2024-01-31 DIAGNOSIS — G35 MULTIPLE SCLEROSIS (MULTI): ICD-10-CM

## 2024-01-31 DIAGNOSIS — H46.9 RIGHT OPTIC NEURITIS: Primary | ICD-10-CM

## 2024-01-31 PROCEDURE — 99205 OFFICE O/P NEW HI 60 MIN: CPT | Performed by: PSYCHIATRY & NEUROLOGY

## 2024-01-31 PROCEDURE — 92083 EXTENDED VISUAL FIELD XM: CPT | Performed by: PSYCHIATRY & NEUROLOGY

## 2024-01-31 PROCEDURE — 92133 CPTRZD OPH DX IMG PST SGM ON: CPT | Performed by: PSYCHIATRY & NEUROLOGY

## 2024-01-31 ASSESSMENT — ENCOUNTER SYMPTOMS
CARDIOVASCULAR NEGATIVE: 0
NEUROLOGICAL NEGATIVE: 0
HEMATOLOGIC/LYMPHATIC NEGATIVE: 0
EYES NEGATIVE: 1
MUSCULOSKELETAL NEGATIVE: 0
GASTROINTESTINAL NEGATIVE: 0
ALLERGIC/IMMUNOLOGIC NEGATIVE: 0
RESPIRATORY NEGATIVE: 0
PSYCHIATRIC NEGATIVE: 0
CONSTITUTIONAL NEGATIVE: 0
ENDOCRINE NEGATIVE: 0

## 2024-01-31 ASSESSMENT — VISUAL ACUITY
METHOD: SNELLEN - LINEAR
OD_CC: 20/30-2
OS_CC: 20/70

## 2024-01-31 ASSESSMENT — CONF VISUAL FIELD
OS_INFERIOR_TEMPORAL_RESTRICTION: 0
OS_SUPERIOR_TEMPORAL_RESTRICTION: 0
OD_NORMAL: 1
OD_INFERIOR_NASAL_RESTRICTION: 0
OD_INFERIOR_TEMPORAL_RESTRICTION: 0
OS_INFERIOR_NASAL_RESTRICTION: 0
OD_SUPERIOR_NASAL_RESTRICTION: 0
OS_SUPERIOR_NASAL_RESTRICTION: 0
OS_NORMAL: 1
OD_SUPERIOR_TEMPORAL_RESTRICTION: 0

## 2024-01-31 ASSESSMENT — TONOMETRY
IOP_METHOD: TONOPEN
OS_IOP_MMHG: 14
OD_IOP_MMHG: 15

## 2024-01-31 ASSESSMENT — EXTERNAL EXAM - LEFT EYE: OS_EXAM: NORMAL

## 2024-01-31 ASSESSMENT — EXTERNAL EXAM - RIGHT EYE: OD_EXAM: NORMAL

## 2024-01-31 ASSESSMENT — SLIT LAMP EXAM - LIDS
COMMENTS: NORMAL
COMMENTS: NORMAL

## 2024-01-31 NOTE — PROGRESS NOTES
Assessment and Plan    01/02/2024 MRI brain & orbits with contrast, which I personally reviewed, shows enhancement of the right intraorbital optic nerve along with scattered bihemispheric FLAIR lesions.  Prior head imaging.    1/31/2024 OCT RNFL OD 90 & OS 91.    1/31/2024 HVF 24-2 OD fovea 33, wnl MD -0.33 & OS fovea 36, wnl MD -0.86.    This 48 year-old woman with a history of mright optic neuritis 12/2023, multiple sclerosis, left congenital anterior polar cataract, HTN, migraine, obesity presents for evaluation of right optic neuritis.    She has impaired right eye vision consistent with recent optic neuritis. The course of recovery is underway, but the ultimate outcome is not yet known as recovery continue for months, perhaps even up to a year. The 20/30 level of vision makes incomplete recovery much more likely than new optic neuritis. We could pursue other testing for other causes of optic neuropathy.    This relapse also makes disease modifying therapy important in the long term.    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 The option of cataract extraction exists although developmental amblyopia likely will limit vision even if cataract extraction is pursued.    Plan    Decide on disease modifying therapy.  Allow time for recovery.  Defer vitamin A, B12, folate, thiamine, syphilis antibody, T-SPOT & ACE.    Follow up in 1-2 months with HVF & OCT. (Dilated 1/31/2024)

## 2024-02-01 LAB
NIL(NEG) CONTROL SPOT COUNT: NORMAL
PANEL A SPOT COUNT: 0
PANEL B SPOT COUNT: 0
POS CONTROL SPOT COUNT: NORMAL
T-SPOT. TB INTERPRETATION: NEGATIVE

## 2024-02-02 DIAGNOSIS — G35 MULTIPLE SCLEROSIS (MULTI): Primary | ICD-10-CM

## 2024-02-02 RX ORDER — OFATUMUMAB 20 MG/.4ML
20 INJECTION, SOLUTION SUBCUTANEOUS
Qty: 1.2 ML | Refills: 0 | Status: SHIPPED | OUTPATIENT
Start: 2024-02-02 | End: 2024-02-17

## 2024-02-02 RX ORDER — OFATUMUMAB 20 MG/.4ML
20 INJECTION, SOLUTION SUBCUTANEOUS
Qty: 0.4 ML | Refills: 5 | Status: SHIPPED | OUTPATIENT
Start: 2024-02-02

## 2024-02-05 ENCOUNTER — OFFICE VISIT (OUTPATIENT)
Dept: ORTHOPEDIC SURGERY | Facility: CLINIC | Age: 49
End: 2024-02-05
Payer: COMMERCIAL

## 2024-02-05 VITALS — BODY MASS INDEX: 32.33 KG/M2 | WEIGHT: 206 LBS | HEIGHT: 67 IN

## 2024-02-05 DIAGNOSIS — M17.12 OSTEOARTHRITIS OF LEFT PATELLOFEMORAL JOINT: Primary | ICD-10-CM

## 2024-02-05 PROCEDURE — 99213 OFFICE O/P EST LOW 20 MIN: CPT | Performed by: STUDENT IN AN ORGANIZED HEALTH CARE EDUCATION/TRAINING PROGRAM

## 2024-02-05 PROCEDURE — 20610 DRAIN/INJ JOINT/BURSA W/O US: CPT | Performed by: STUDENT IN AN ORGANIZED HEALTH CARE EDUCATION/TRAINING PROGRAM

## 2024-02-05 PROCEDURE — 1036F TOBACCO NON-USER: CPT | Performed by: STUDENT IN AN ORGANIZED HEALTH CARE EDUCATION/TRAINING PROGRAM
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
## 2024-02-08 ENCOUNTER — OFFICE VISIT (OUTPATIENT)
Dept: PRIMARY CARE | Facility: CLINIC | Age: 49
End: 2024-02-08
Payer: COMMERCIAL

## 2024-02-08 VITALS
OXYGEN SATURATION: 97 % | TEMPERATURE: 98.6 F | DIASTOLIC BLOOD PRESSURE: 80 MMHG | HEART RATE: 96 BPM | SYSTOLIC BLOOD PRESSURE: 118 MMHG

## 2024-02-08 DIAGNOSIS — R09.81 NASAL CONGESTION: ICD-10-CM

## 2024-02-08 DIAGNOSIS — J32.9 SINUSITIS, UNSPECIFIED CHRONICITY, UNSPECIFIED LOCATION: Primary | ICD-10-CM

## 2024-02-08 DIAGNOSIS — G43.109 MIGRAINE WITH AURA AND WITHOUT STATUS MIGRAINOSUS, NOT INTRACTABLE: ICD-10-CM

## 2024-02-08 DIAGNOSIS — R05.1 ACUTE COUGH: ICD-10-CM

## 2024-02-08 PROCEDURE — 3079F DIAST BP 80-89 MM HG: CPT | Performed by: STUDENT IN AN ORGANIZED HEALTH CARE EDUCATION/TRAINING PROGRAM

## 2024-02-08 PROCEDURE — 1036F TOBACCO NON-USER: CPT | Performed by: STUDENT IN AN ORGANIZED HEALTH CARE EDUCATION/TRAINING PROGRAM

## 2024-02-08 PROCEDURE — 3074F SYST BP LT 130 MM HG: CPT | Performed by: STUDENT IN AN ORGANIZED HEALTH CARE EDUCATION/TRAINING PROGRAM

## 2024-02-08 PROCEDURE — 99214 OFFICE O/P EST MOD 30 MIN: CPT | Performed by: STUDENT IN AN ORGANIZED HEALTH CARE EDUCATION/TRAINING PROGRAM

## 2024-02-08 RX ORDER — AZELASTINE 1 MG/ML
1 SPRAY, METERED NASAL 2 TIMES DAILY
Qty: 30 ML | Refills: 2 | Status: SHIPPED | OUTPATIENT
Start: 2024-02-08

## 2024-02-08 RX ORDER — DOXYCYCLINE 100 MG/1
100 CAPSULE ORAL 2 TIMES DAILY
Qty: 20 CAPSULE | Refills: 0 | Status: SHIPPED | OUTPATIENT
Start: 2024-02-08 | End: 2024-02-18

## 2024-02-08 RX ORDER — FLUTICASONE PROPIONATE 50 MCG
1 SPRAY, SUSPENSION (ML) NASAL 2 TIMES DAILY
Qty: 16 G | Refills: 1 | Status: SHIPPED | OUTPATIENT
Start: 2024-02-08

## 2024-02-08 RX ORDER — METHYLPREDNISOLONE 4 MG/1
TABLET ORAL
Qty: 21 TABLET | Refills: 0 | Status: CANCELLED | OUTPATIENT
Start: 2024-02-08

## 2024-02-08 NOTE — PROGRESS NOTES
Subjective   Patient ID: Becky Mendiola is a 48 y.o. female who presents for Sinusitis and Cough.    HPI   Sinusitis/ cough and congestion  Patient states that for 3 weeks she has been experiencing frontal and maxillary sinus pressure and pain.  She is having nasal congestion, worst in the morning and night, which varied in consistency and color.  Also complains of dry cough more prominent when lying down which patient associated with postnasal drip.  Admits of having sensation of clogged ears and muffled tones  Denies any fever or chills, denies any sore throat or shortness of breath.  Admits that 3 weeks ago she had a trip to [place] for a convention and being in contact lots of people and also in the plane.  States that her symptoms started few days later after being back from [facility].  Patient has a history of seasonal allergic rhinitis and uses over-the-counter cetirizine daily.      Review of Systems  All pertinent positive symptoms are included in the history of present illness.    All other systems have been reviewed and are negative and noncontributory to this patient's current ailments.  Objective   /80 (BP Location: Left arm, Patient Position: Sitting, BP Cuff Size: Adult)   Pulse 96   Temp 37 °C (98.6 °F)   SpO2 97%     Physical Exam  CONSTITUTIONAL - well nourished, well developed, looks like stated age, in no acute distress, not ill-appearing, and not tired appearing  SKIN - normal skin color and pigmentation, normal skin turgor without rash, lesions, or nodules visualized  ENT -TMs visualized and clear, external canal without inflammation, throat and tonsillar without erythema, no exudate, nasal mucosa erythematosus and with clear exudate  HEAD - no trauma, normocephalic  EYES - normal external exam  CHEST -no distressed breathing, good effort, normal air movement, no wheezing, rales or rhonchi  EXTREMITIES - no edema, no deformities  NEUROLOGICAL - normal balance, normal motor, no  ataxia  PSYCHIATRIC - alert, pleasant and cordial, age-appropriate    Assessment/Plan   Diagnoses and all orders for this visit:  Sinusitis, unspecified chronicity, unspecified location  Nasal congestion  Acute cough  Clinical presentation and physical exam were consistent with acute sinusitis secondary to previous viral URI.  Differential diagnosis includes seasonal allergic rhinitis  Since patient is allergic to amoxicillin we will treat with doxycycline 100 mg twice daily for 10 days.  Alongside we gave a prescription for Flonase and Astelin, instructed proper use of nasal sprays.  Please follow-up with our office if symptoms do not improve in 10 to 14 days    Thank you for letting us be a part of your care team.  Please call the office if you have further questions or concerns regarding your care    Otherwise, please follow-up in 6 months for continued care and refills as well as your yearly physical examination    Ines Moore MD  PGY1 FM Resident

## 2024-02-12 ENCOUNTER — OFFICE VISIT (OUTPATIENT)
Dept: ORTHOPEDIC SURGERY | Facility: CLINIC | Age: 49
End: 2024-02-12
Payer: COMMERCIAL

## 2024-02-12 DIAGNOSIS — M17.12 OSTEOARTHRITIS OF LEFT PATELLOFEMORAL JOINT: Primary | ICD-10-CM

## 2024-02-12 PROCEDURE — 99213 OFFICE O/P EST LOW 20 MIN: CPT | Performed by: STUDENT IN AN ORGANIZED HEALTH CARE EDUCATION/TRAINING PROGRAM

## 2024-02-12 PROCEDURE — 1036F TOBACCO NON-USER: CPT | Performed by: STUDENT IN AN ORGANIZED HEALTH CARE EDUCATION/TRAINING PROGRAM

## 2024-02-12 PROCEDURE — 20610 DRAIN/INJ JOINT/BURSA W/O US: CPT | Performed by: STUDENT IN AN ORGANIZED HEALTH CARE EDUCATION/TRAINING PROGRAM
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
## 2024-03-29 ENCOUNTER — APPOINTMENT (OUTPATIENT)
Dept: OPHTHALMOLOGY | Facility: CLINIC | Age: 49
End: 2024-03-29
Payer: COMMERCIAL

## 2024-04-20 DIAGNOSIS — E55.9 VITAMIN D DEFICIENCY: ICD-10-CM

## 2024-04-22 DIAGNOSIS — M25.562 CHRONIC PAIN OF LEFT KNEE: ICD-10-CM

## 2024-04-22 DIAGNOSIS — G89.29 CHRONIC PAIN OF LEFT KNEE: ICD-10-CM

## 2024-04-22 RX ORDER — ERGOCALCIFEROL 1.25 MG/1
1 CAPSULE ORAL
Qty: 12 CAPSULE | Refills: 1 | Status: SHIPPED | OUTPATIENT
Start: 2024-04-28

## 2024-04-23 RX ORDER — NAPROXEN 500 MG/1
500 TABLET ORAL 2 TIMES DAILY PRN
Qty: 60 TABLET | Refills: 2 | Status: SHIPPED | OUTPATIENT
Start: 2024-04-23 | End: 2024-07-22

## 2024-08-04 DIAGNOSIS — I10 PRIMARY HYPERTENSION: ICD-10-CM

## 2024-08-05 DIAGNOSIS — M25.562 CHRONIC PAIN OF LEFT KNEE: ICD-10-CM

## 2024-08-05 DIAGNOSIS — G89.29 CHRONIC PAIN OF LEFT KNEE: ICD-10-CM

## 2024-08-06 RX ORDER — LISINOPRIL 20 MG/1
20 TABLET ORAL DAILY
Qty: 30 TABLET | Refills: 0 | Status: SHIPPED | OUTPATIENT
Start: 2024-08-06

## 2024-08-06 RX ORDER — NAPROXEN 500 MG/1
500 TABLET ORAL 2 TIMES DAILY PRN
Qty: 60 TABLET | Refills: 0 | Status: SHIPPED | OUTPATIENT
Start: 2024-08-06

## 2024-08-08 ENCOUNTER — OFFICE VISIT (OUTPATIENT)
Dept: PRIMARY CARE | Facility: CLINIC | Age: 49
End: 2024-08-08
Payer: COMMERCIAL

## 2024-08-08 VITALS
OXYGEN SATURATION: 100 % | WEIGHT: 210 LBS | BODY MASS INDEX: 32.89 KG/M2 | DIASTOLIC BLOOD PRESSURE: 76 MMHG | SYSTOLIC BLOOD PRESSURE: 112 MMHG | HEART RATE: 98 BPM

## 2024-08-08 DIAGNOSIS — M62.838 MUSCLE SPASM: ICD-10-CM

## 2024-08-08 DIAGNOSIS — M54.2 NECK PAIN: Primary | ICD-10-CM

## 2024-08-08 DIAGNOSIS — Z00.00 HEALTH MAINTENANCE EXAMINATION: ICD-10-CM

## 2024-08-08 PROCEDURE — 3074F SYST BP LT 130 MM HG: CPT | Performed by: STUDENT IN AN ORGANIZED HEALTH CARE EDUCATION/TRAINING PROGRAM

## 2024-08-08 PROCEDURE — 3078F DIAST BP <80 MM HG: CPT | Performed by: STUDENT IN AN ORGANIZED HEALTH CARE EDUCATION/TRAINING PROGRAM

## 2024-08-08 PROCEDURE — 1036F TOBACCO NON-USER: CPT | Performed by: STUDENT IN AN ORGANIZED HEALTH CARE EDUCATION/TRAINING PROGRAM

## 2024-08-08 PROCEDURE — 99214 OFFICE O/P EST MOD 30 MIN: CPT | Performed by: STUDENT IN AN ORGANIZED HEALTH CARE EDUCATION/TRAINING PROGRAM

## 2024-08-08 RX ORDER — METHYLPREDNISOLONE 4 MG/1
TABLET ORAL
Qty: 21 TABLET | Refills: 0 | Status: SHIPPED | OUTPATIENT
Start: 2024-08-08

## 2024-08-08 RX ORDER — CYCLOBENZAPRINE HCL 5 MG
5 TABLET ORAL 3 TIMES DAILY
Qty: 30 TABLET | Refills: 0 | Status: SHIPPED | OUTPATIENT
Start: 2024-08-08

## 2024-08-08 ASSESSMENT — PATIENT HEALTH QUESTIONNAIRE - PHQ9
1. LITTLE INTEREST OR PLEASURE IN DOING THINGS: NOT AT ALL
SUM OF ALL RESPONSES TO PHQ9 QUESTIONS 1 AND 2: 0
2. FEELING DOWN, DEPRESSED OR HOPELESS: NOT AT ALL

## 2024-08-08 NOTE — PROGRESS NOTES
Subjective   Patient ID: Becky Mendiola is a 48 y.o. female who presents for Pinched Nerve/Neck Right Sided.  Today she is accompanied by alone.     1.  Neck pain  Patient has been having neck pain going on since Monday.  Describes neck pain as constant, very painful, shooting and throbbing, it is worse on movement.  It has been gradually worsening.  Does not recall any injury.  Acute onset.  She states that she has shooting pain going down her arm.  She has been unable to sleep due to positioning.  She says the pain is worse when she is extending her neck, rotating her head to the right side bending to the right.  She has tried IcyHot, heat and ice, NSAIDs, neck support, stretches, breast and naproxen 500 mg which has not been helping at all.  Denies any headaches, changes in her vision, syncope, photophobia.  Does endorse numbness and tingling going into her shoulder as well as going down her arm.    2.  Weight gain  Patient like to discuss her weight gain.  States that she has had trouble losing weight for the past year.  Instead has gained 5 pounds.  Believes that menopause has been making it harder to lose weight.  Has been dieting and working out with no improvement.  Would like to discuss medications that may help.      Current Outpatient Medications on File Prior to Visit   Medication Sig Dispense Refill    albuterol (ProAir HFA) 90 mcg/actuation inhaler Inhale 2 puffs every 4 hours if needed for wheezing or shortness of breath. 8.5 g 1    azelastine (Astelin) 137 mcg (0.1 %) nasal spray Administer 1 spray into each nostril 2 times a day. 30 mL 2    ergocalciferol (Vitamin D-2) 1.25 MG (66319 UT) capsule TAKE 1 CAPSULE (1,250 MCG) BY MOUTH 1 (ONE) TIME PER WEEK. 12 capsule 1    fluticasone (Flonase) 50 mcg/actuation nasal spray Administer 1 spray into each nostril 2 times a day. 16 g 1    lisinopril 20 mg tablet TAKE 1 TABLET BY MOUTH EVERY DAY 30 tablet 0    naproxen (Naprosyn) 500 mg tablet Take 1 tablet  (500 mg) by mouth 2 times a day as needed for mild pain (1 - 3) (pain). 60 tablet 0    ofatumumab (Kesimpta Pen) 20 mg/0.4 mL injection Inject 0.4 mL (20 mg) under the skin every 7 days for 3 doses. Inject 20 mg under the skin once weekly at weeks 0,1, and 2. 1.2 mL 0    ofatumumab (Kesimpta Pen) 20 mg/0.4 mL injection Inject 0.4 mL (20 mg) under the skin every 28 (twenty-eight) days. Inject 20 mg under the skin once monthly starting at week 4. 0.4 mL 5    traZODone (Desyrel) 150 mg tablet Take 1 tablet (150 mg) by mouth once daily at bedtime. 90 tablet 1    ubrogepant 50 mg tablet Take 2 tablets (100 mg) by mouth if needed (Migraines) for up to 1 dose. 15 tablet 1    [DISCONTINUED] lisinopril 20 mg tablet Take 1 tablet (20 mg) by mouth once daily. 90 tablet 1    [DISCONTINUED] naproxen (Naprosyn) 500 mg tablet TAKE 1 TABLET (500 MG) BY MOUTH 2 TIMES A DAY AS NEEDED FOR MILD PAIN (1 - 3) (PAIN). 60 tablet 2     No current facility-administered medications on file prior to visit.        Allergies   Allergen Reactions    Amoxicillin Unknown    Sulfa (Sulfonamide Antibiotics) Unknown       Immunization History   Administered Date(s) Administered    Influenza, seasonal, injectable 10/12/2020    Tdap vaccine, age 7 year and older (BOOSTRIX, ADACEL) 08/01/2017, 01/10/2023         All pertinent positive symptoms are included in the history of present illness.  All other systems have been reviewed and are negative and noncontributory to this patient's current ailments.     Objective   /76 (BP Location: Left arm, Patient Position: Sitting, BP Cuff Size: Adult)   Pulse 98   Wt 95.3 kg (210 lb)   SpO2 100%   BMI 32.89 kg/m²   BSA: 2.12 meters squared  No visits with results within 1 Month(s) from this visit.   Latest known visit with results is:   Lab on 01/29/2024   Component Date Value Ref Range Status    Hepatitis B Core AB- Total 01/29/2024 Nonreactive  Nonreactive Final    Hepatitis B Surface AB 01/29/2024  >1,000.0 (H)  <10.0 mIU/mL Final    Interpretive Criteria:                         <10 mIU/mL    Nonreactive                          >=10 mIU/mL    Reactive     Biotin interference may cause falsely decreased results. Patients taking a Biotin dose of up to 5 mg/day should refrain from taking Biotin for 24 hours before sample collection. Providers may contact their local laboratory for further information.    Hepatitis B Surface AG 01/29/2024 Nonreactive  Nonreactive Final    Biotin interference may cause falsely decreased results. Patients taking a Biotin dose of up to 5 mg/day should refrain from taking Biotin for 24 hours before sample collection. Providers may contact their local laboratory for  further information.    IgG 01/29/2024 879  700 - 1,600 mg/dL Final    IgA 01/29/2024 208  70 - 400 mg/dL Final    IgM 01/29/2024 135  40 - 230 mg/dL Final    T-SPOT. TB Interpretation 01/29/2024 Negative  Negative Final       A negative test result does not exclude the possibility  of exposure to or infection with Mycobacterium  tuberculosis (M. tuberculosis). Patients with recent  exposure to TB infected individuals exhibiting a  negative T-SPOT.TB result should be considered for  retesting within 6 weeks or if other relevant clinical  symptoms indicate. Results from T-SPOT.TB testing must  be used in conjunction with each individual's  epidemiological history, current medical status,  and results of other diagnostic evaluations.            The T-SPOT.TB test is qualitative and results are  reported as positive, borderline, or negative, given  that the test controls perform as expected. In line  with the Centers for Disease Control and Prevention's  2010 recommendation to report quantitative measurements  alongside the qualitative result, the laboratory  provides spot counts for informational purposes only.  The T-SPOT.TB test should not be interpreted as a  quantitative test.       Panel A Spot Count 01/29/2024 0    Final    Panel B Spot Count 01/29/2024 0   Final    NIL(NEG) Control Spot Count 01/29/2024 Passed   Final    POS Control Spot Count 01/29/2024 Passed   Final       For additional information, please refer to  http://education.Protecode/faq/DQR209     (This link is being provided for informational/  educational purposes only.)           Varicella Zoster, IgG 01/29/2024 Positive (A)  Negative Final    Varicella Zoster, IgG Index 01/29/2024 1.9 (H)  <=0.8 IA Final       Physical Exam  CONSTITUTIONAL - well nourished, well developed, looks like stated age, in no acute distress, not ill-appearing, and not tired appearing  SKIN - normal skin color and pigmentation, normal skin turgor without rash, lesions, or nodules visualized  HEAD - no trauma, normocephalic  EYES - extraocular muscles are intact, and normal external exam  ENT - uvula midline, normal tongue movement and throat normal, no exudate, nasal passage without discharge and patent  NECK - supple without rigidity, decreased range of motion, limited with right sidebending as well as slight rotation and extension  LUNG - clear to auscultation, no wheezing, no crackles and no rales, good effort  CARDIAC - regular rate and regular rhythm, no skipped beats, no murmur  ABDOMEN - no organomegaly, soft, nontender, nondistended, normal bowel sounds  EXTREMITIES - no edema, no deformities  NEUROLOGICAL - normal gait, normal balance, normal motor, alert, oriented and no focal signs  PSYCHIATRIC - alert, pleasant and cordial, age-appropriate      Assessment/Plan       Neck pain  A requisition for cervical x-ray has been placed, please get this done at your earliest convenience  A prescription for Flexeril has been sent to your pharmacy.  Discussed the risk and benefits as well as potential side effects.  In addition we will also send a prescription for a measure Dosepak to your pharmacy.  Continue to naproxen as needed for pain.  Return to the clinic in 7-10 days  or sooner if symptoms worsen or persist as we will then further evaluate    2.  Weight gain  Requisition for blood work has been placed, please get this done at your earliest convenience.  Discussed potential treatment options.  We will discuss this further after you have done your blood work, we will follow-up in a couple weeks to discuss weight gain as well as to your annual physical examination.

## 2024-08-09 ENCOUNTER — LAB (OUTPATIENT)
Dept: LAB | Facility: LAB | Age: 49
End: 2024-08-09
Payer: COMMERCIAL

## 2024-08-09 DIAGNOSIS — Z00.00 HEALTH MAINTENANCE EXAMINATION: ICD-10-CM

## 2024-08-09 LAB
ALBUMIN SERPL BCP-MCNC: 4.7 G/DL (ref 3.4–5)
ALP SERPL-CCNC: 90 U/L (ref 33–110)
ALT SERPL W P-5'-P-CCNC: 25 U/L (ref 7–45)
ANION GAP SERPL CALC-SCNC: 14 MMOL/L (ref 10–20)
AST SERPL W P-5'-P-CCNC: 18 U/L (ref 9–39)
BASOPHILS # BLD AUTO: 0.03 X10*3/UL (ref 0–0.1)
BASOPHILS NFR BLD AUTO: 0.2 %
BILIRUB SERPL-MCNC: 0.5 MG/DL (ref 0–1.2)
BUN SERPL-MCNC: 14 MG/DL (ref 6–23)
CALCIUM SERPL-MCNC: 9.3 MG/DL (ref 8.6–10.3)
CHLORIDE SERPL-SCNC: 104 MMOL/L (ref 98–107)
CHOLEST SERPL-MCNC: 212 MG/DL (ref 0–199)
CHOLESTEROL/HDL RATIO: 3.8
CO2 SERPL-SCNC: 24 MMOL/L (ref 21–32)
CREAT SERPL-MCNC: 0.7 MG/DL (ref 0.5–1.05)
EGFRCR SERPLBLD CKD-EPI 2021: >90 ML/MIN/1.73M*2
EOSINOPHIL # BLD AUTO: 0.02 X10*3/UL (ref 0–0.7)
EOSINOPHIL NFR BLD AUTO: 0.2 %
ERYTHROCYTE [DISTWIDTH] IN BLOOD BY AUTOMATED COUNT: 12.8 % (ref 11.5–14.5)
EST. AVERAGE GLUCOSE BLD GHB EST-MCNC: 103 MG/DL
GLUCOSE SERPL-MCNC: 84 MG/DL (ref 74–99)
HBA1C MFR BLD: 5.2 %
HCT VFR BLD AUTO: 45.3 % (ref 36–46)
HDLC SERPL-MCNC: 55.7 MG/DL
HGB BLD-MCNC: 14.8 G/DL (ref 12–16)
IMM GRANULOCYTES # BLD AUTO: 0.06 X10*3/UL (ref 0–0.7)
IMM GRANULOCYTES NFR BLD AUTO: 0.5 % (ref 0–0.9)
LDLC SERPL CALC-MCNC: 132 MG/DL
LYMPHOCYTES # BLD AUTO: 1.77 X10*3/UL (ref 1.2–4.8)
LYMPHOCYTES NFR BLD AUTO: 14 %
MCH RBC QN AUTO: 30.8 PG (ref 26–34)
MCHC RBC AUTO-ENTMCNC: 32.7 G/DL (ref 32–36)
MCV RBC AUTO: 94 FL (ref 80–100)
MONOCYTES # BLD AUTO: 0.7 X10*3/UL (ref 0.1–1)
MONOCYTES NFR BLD AUTO: 5.5 %
NEUTROPHILS # BLD AUTO: 10.06 X10*3/UL (ref 1.2–7.7)
NEUTROPHILS NFR BLD AUTO: 79.6 %
NON HDL CHOLESTEROL: 156 MG/DL (ref 0–149)
NRBC BLD-RTO: 0 /100 WBCS (ref 0–0)
PLATELET # BLD AUTO: 298 X10*3/UL (ref 150–450)
POTASSIUM SERPL-SCNC: 4.2 MMOL/L (ref 3.5–5.3)
PROT SERPL-MCNC: 7.3 G/DL (ref 6.4–8.2)
RBC # BLD AUTO: 4.8 X10*6/UL (ref 4–5.2)
SODIUM SERPL-SCNC: 138 MMOL/L (ref 136–145)
TRIGL SERPL-MCNC: 120 MG/DL (ref 0–149)
TSH SERPL-ACNC: 1.82 MIU/L (ref 0.44–3.98)
VLDL: 24 MG/DL (ref 0–40)
WBC # BLD AUTO: 12.6 X10*3/UL (ref 4.4–11.3)

## 2024-08-09 PROCEDURE — 83036 HEMOGLOBIN GLYCOSYLATED A1C: CPT

## 2024-08-09 PROCEDURE — 80061 LIPID PANEL: CPT

## 2024-08-09 PROCEDURE — 85025 COMPLETE CBC W/AUTO DIFF WBC: CPT

## 2024-08-09 PROCEDURE — 36415 COLL VENOUS BLD VENIPUNCTURE: CPT

## 2024-08-09 PROCEDURE — 84443 ASSAY THYROID STIM HORMONE: CPT

## 2024-08-09 PROCEDURE — 80053 COMPREHEN METABOLIC PANEL: CPT

## 2024-08-11 NOTE — RESULT ENCOUNTER NOTE
Cholesterol did increase now up to 212, HDL 55, , triglycerides 120    Complete blood cell count does show a slight elevation at 12.6 as well as a slight elevation in the neutrophils at 10.06  This could be due to the patient having some neck pain and/or her taking the steroid    Hemoglobin A1c within normal limits at 5.2% which is perfect and she is not prediabetic/diabetic    Thyroid within normal limits    Sugar, kidneys, liver, electrolytes are all within normal limits

## 2024-08-12 DIAGNOSIS — M79.2 NERVE PAIN: ICD-10-CM

## 2024-08-12 RX ORDER — GABAPENTIN 300 MG/1
300 CAPSULE ORAL 3 TIMES DAILY
Qty: 30 CAPSULE | Refills: 0 | Status: SHIPPED | OUTPATIENT
Start: 2024-08-12 | End: 2024-08-20 | Stop reason: SDUPTHER

## 2024-08-15 DIAGNOSIS — G35 MULTIPLE SCLEROSIS (MULTI): ICD-10-CM

## 2024-08-15 DIAGNOSIS — Z79.899 IMMUNODEFICIENCY DUE TO DRUG THERAPY (MULTI): Primary | ICD-10-CM

## 2024-08-15 DIAGNOSIS — D84.821 IMMUNODEFICIENCY DUE TO DRUG THERAPY (MULTI): Primary | ICD-10-CM

## 2024-08-15 RX ORDER — OFATUMUMAB 20 MG/.4ML
20 INJECTION, SOLUTION SUBCUTANEOUS
Qty: 0.4 ML | Refills: 1 | Status: SHIPPED | OUTPATIENT
Start: 2024-08-15

## 2024-08-18 ASSESSMENT — PROMIS GLOBAL HEALTH SCALE
CARRYOUT_PHYSICAL_ACTIVITIES: MOSTLY
EMOTIONAL_PROBLEMS: RARELY
RATE_PHYSICAL_HEALTH: GOOD
RATE_SOCIAL_SATISFACTION: VERY GOOD
RATE_MENTAL_HEALTH: EXCELLENT
RATE_AVERAGE_PAIN: 7
RATE_QUALITY_OF_LIFE: VERY GOOD
CARRYOUT_SOCIAL_ACTIVITIES: VERY GOOD
RATE_AVERAGE_FATIGUE: MODERATE
RATE_GENERAL_HEALTH: VERY GOOD

## 2024-08-19 NOTE — PROGRESS NOTES
Subjective   Patient ID: Becky Mendiola is a 48 y.o. female who presents for Annual Exam.  Today she is accompanied by alone.     HPI  # Health maintenance  Overall patient is doing well.   Denies any chest pain, shortness of breath or wheezing upon exertion.  Denies any fever chills or constitutional symptoms, denies any unintentional weight loss.  Denies any nausea/vomiting or constipation/diarrhea.  Denies any urinary symptoms.  Denies any recent change in hearing or vision.  Denies any lightheadedness, dizziness or balance problem   Immunization: Tdap 1/2023  Influenza vaccine declined  Shingrix recommended at age 50  COVID-19 vaccine (Pfizer Moderna) declined:  Colon Cancer Screening: No family history, Cologuard performed in March 2022, negative.   OB/GYN: Pap smear 8/2023 was normal, with 5 year clearance and mammogram 9/2023 are up to date, needs new referral for mammogram   Diet: Well-balanced diet  Exercise: Limited exercise with walking and minimal weight lifting  Tobacco: Denies use  EtOH: Rarely Socially     Other problems/diagnoses addressed and reviewed during this encounter     1.  Left knee osteoarthritis  Patient has a chronic history of left-sided knee pain as well as arthritis  She even followed up with ortho Dr. Elias in February 2024 where she underwent her third in a series of 3 Euflexxa injections.   She is status post left knee scope lateral release on 6/8/2020  MRI of the knee 11/2023 shows severe patellofemoral compartment cartilage loss as well as mild cartilage loss in the tibiofemoral compartments   There is also a cyst that has been stable from the prior imaging.  Stated that she feels much better after knee injections     2. Benign essential hypertension  Continues to take lisinopril 20 mg daily as indicated in the chart  Blood pressure today 115/80.  Denies any cardiopulmonary symptoms  Requesting refills     3. Insomnia  Patient is taking half tablet of trazodone 150 mg, stating  that she feels well.  She is wondering if she can decrease the dose  She is well rested in the morning.   Requesting refills to be sent to her local pharmacy     4. Vitamin D deficiency  Continues to take vitamin D 50,000 International Units weekly.  Patient feels well, without any complaints     5. Migraines  She has been using the Ubrelvy and stated that this works great  Willing and wishing to continue this medication on as-needed basis  Requesting refills  Denies numbness or tingling, dysphagia, weakness, loss of eye sight.    6. Multiple Sclerosis complicated with Right Optic nerve neuritis  01/02/2024 MRI brain & orbits with contrast shows enhancement of the right intraorbital optic nerve along with scattered bihemispheric FLAIR lesions.   She has impaired right eye vision consistent with optic neuritis in January 2024. She saw Dr. Steward at that time  She is recovering, but the ultimate outcome is not yet known as recovery continue for months, perhaps even up to a year, stated that she is close to be at her baseline  Discussion for disease modifying therapy was held at that time, and she was started on Ofatumumab 20 mg q 4 weeks.    7.  Neck pain/ right shoulder pain  Patient continues to have neck and right shoulder pain with marked restricted range of motion, which is interfering with her driving abilities, and ADLs.  Currently she is on gabapentin, naproxen, and doing in-home several exercises.  States that Flexeril is not helping.  She was seen here on August 8, but the pain continue to be the same.  Describes neck pain as constant, very painful, shooting and throbbing, it is worse on movement. Does not recall any injury.  She states that she has shooting pain going down her arm.  She has been unable to sleep due to positioning.  She says the pain is worse when she is extending her neck, rotating her head to the right side bending to the right.    8.  Obesity  Patient is here today to discuss about her  recent weight gain.  She states that due to her MS, knee OA and recurrent shoulder and neck pain she is unable to do extensive work out.  Also admitted that being her menopausal is not helping with losing weight.  Patient is willing to discuss different options to lose weight    9.  Mixed hyperlipidemia  Most recent lipid panel showed Cholesterol did increase now up to 212, HDL 55, , triglycerides 120 .  ASCVD risk score is 1.3%  Patient denies any cardio-vascular symptoms        Current Outpatient Medications on File Prior to Visit   Medication Sig Dispense Refill    albuterol (ProAir HFA) 90 mcg/actuation inhaler Inhale 2 puffs every 4 hours if needed for wheezing or shortness of breath. 8.5 g 1    azelastine (Astelin) 137 mcg (0.1 %) nasal spray Administer 1 spray into each nostril 2 times a day. 30 mL 2    cyclobenzaprine (Flexeril) 5 mg tablet Take 1 tablet (5 mg) by mouth 3 times a day. 30 tablet 0    fluticasone (Flonase) 50 mcg/actuation nasal spray Administer 1 spray into each nostril 2 times a day. 16 g 1    methylPREDNISolone (Medrol Dospak) 4 mg tablets Take as directed on package. 21 tablet 0    ofatumumab (Kesimpta Pen) 20 mg/0.4 mL injection Inject 0.4 mL (20 mg) under the skin every 28 (twenty-eight) days. 0.4 mL 1    [DISCONTINUED] ergocalciferol (Vitamin D-2) 1.25 MG (95684 UT) capsule TAKE 1 CAPSULE (1,250 MCG) BY MOUTH 1 (ONE) TIME PER WEEK. 12 capsule 1    [DISCONTINUED] gabapentin (Neurontin) 300 mg capsule Take 1 capsule (300 mg) by mouth 3 times a day. 30 capsule 0    [DISCONTINUED] lisinopril 20 mg tablet TAKE 1 TABLET BY MOUTH EVERY DAY 30 tablet 0    [DISCONTINUED] naproxen (Naprosyn) 500 mg tablet Take 1 tablet (500 mg) by mouth 2 times a day as needed for mild pain (1 - 3) (pain). 60 tablet 0    [DISCONTINUED] ofatumumab (Kesimpta Pen) 20 mg/0.4 mL injection Inject 0.4 mL (20 mg) under the skin every 7 days for 3 doses. Inject 20 mg under the skin once weekly at weeks 0,1, and 2.  "1.2 mL 0    [DISCONTINUED] ofatumumab (Kesimpta Pen) 20 mg/0.4 mL injection Inject 0.4 mL (20 mg) under the skin every 28 (twenty-eight) days. Inject 20 mg under the skin once monthly starting at week 4. 0.4 mL 5    [DISCONTINUED] traZODone (Desyrel) 150 mg tablet Take 1 tablet (150 mg) by mouth once daily at bedtime. 90 tablet 1    [DISCONTINUED] ubrogepant 50 mg tablet Take 2 tablets (100 mg) by mouth if needed (Migraines) for up to 1 dose. 15 tablet 1     No current facility-administered medications on file prior to visit.        Allergies   Allergen Reactions    Amoxicillin Unknown    Sulfa (Sulfonamide Antibiotics) Unknown       Immunization History   Administered Date(s) Administered    Influenza, seasonal, injectable 10/12/2020    Tdap vaccine, age 7 year and older (BOOSTRIX, ADACEL) 08/01/2017, 01/10/2023         Review of Systems  All pertinent positive symptoms are included in the history of present illness.  All other systems have been reviewed and are negative and noncontributory to this patient's current ailments.     Objective   /80 (BP Location: Left arm, Patient Position: Sitting, BP Cuff Size: Adult)   Pulse 92   Ht 1.702 m (5' 7\")   Wt 95.3 kg (210 lb)   SpO2 98%   BMI 32.89 kg/m²   BSA: 2.12 meters squared  Lab on 08/09/2024   Component Date Value Ref Range Status    Thyroid Stimulating Hormone 08/09/2024 1.82  0.44 - 3.98 mIU/L Final    Cholesterol 08/09/2024 212 (H)  0 - 199 mg/dL Final          Age      Desirable   Borderline High   High     0-19 Y     0 - 169       170 - 199     >/= 200    20-24 Y     0 - 189       190 - 224     >/= 225         >24 Y     0 - 199       200 - 239     >/= 240   **All ranges are based on fasting samples. Specific   therapeutic targets will vary based on patient-specific   cardiac risk.    Pediatric guidelines reference:Pediatrics 2011, 128(S5).Adult guidelines reference: NCEP ATPIII Guidelines,BEBA 2001, 258:2486-97    Venipuncture immediately after or " during the administration of Metamizole may lead to falsely low results. Testing should be performed immediately prior to Metamizole dosing.    HDL-Cholesterol 08/09/2024 55.7  mg/dL Final      Age       Very Low   Low     Normal    High    0-19 Y    < 35      < 40     40-45     ----  20-24 Y    ----     < 40      >45      ----        >24 Y      ----     < 40     40-60      >60      Cholesterol/HDL Ratio 08/09/2024 3.8   Final      Ref Values  Desirable  < 3.4  High Risk  > 5.0    LDL Calculated 08/09/2024 132 (H)  <=99 mg/dL Final                                Near   Borderline      AGE      Desirable  Optimal    High     High     Very High     0-19 Y     0 - 109     ---    110-129   >/= 130     ----    20-24 Y     0 - 119     ---    120-159   >/= 160     ----      >24 Y     0 -  99   100-129  130-159   160-189     >/=190      VLDL 08/09/2024 24  0 - 40 mg/dL Final    Triglycerides 08/09/2024 120  0 - 149 mg/dL Final       Age         Desirable   Borderline High   High     Very High   0 D-90 D    19 - 174         ----         ----        ----  91 D- 9 Y     0 -  74        75 -  99     >/= 100      ----    10-19 Y     0 -  89        90 - 129     >/= 130      ----    20-24 Y     0 - 114       115 - 149     >/= 150      ----         >24 Y     0 - 149       150 - 199    200- 499    >/= 500    Venipuncture immediately after or during the administration of Metamizole may lead to falsely low results. Testing should be performed immediately prior to Metamizole dosing.    Non HDL Cholesterol 08/09/2024 156 (H)  0 - 149 mg/dL Final          Age       Desirable   Borderline High   High     Very High     0-19 Y     0 - 119       120 - 144     >/= 145    >/= 160    20-24 Y     0 - 149       150 - 189     >/= 190      ----         >24 Y    30 mg/dL above LDL Cholesterol goal      Glucose 08/09/2024 84  74 - 99 mg/dL Final    Sodium 08/09/2024 138  136 - 145 mmol/L Final    Potassium 08/09/2024 4.2  3.5 - 5.3 mmol/L Final     Chloride 08/09/2024 104  98 - 107 mmol/L Final    Bicarbonate 08/09/2024 24  21 - 32 mmol/L Final    Anion Gap 08/09/2024 14  10 - 20 mmol/L Final    Urea Nitrogen 08/09/2024 14  6 - 23 mg/dL Final    Creatinine 08/09/2024 0.70  0.50 - 1.05 mg/dL Final    eGFR 08/09/2024 >90  >60 mL/min/1.73m*2 Final    Calculations of estimated GFR are performed using the 2021 CKD-EPI Study Refit equation without the race variable for the IDMS-Traceable creatinine methods.  https://jasn.asnjournals.org/content/early/2021/09/22/ASN.1198991320    Calcium 08/09/2024 9.3  8.6 - 10.3 mg/dL Final    Albumin 08/09/2024 4.7  3.4 - 5.0 g/dL Final    Alkaline Phosphatase 08/09/2024 90  33 - 110 U/L Final    Total Protein 08/09/2024 7.3  6.4 - 8.2 g/dL Final    AST 08/09/2024 18  9 - 39 U/L Final    Bilirubin, Total 08/09/2024 0.5  0.0 - 1.2 mg/dL Final    ALT 08/09/2024 25  7 - 45 U/L Final    Patients treated with Sulfasalazine may generate falsely decreased results for ALT.    WBC 08/09/2024 12.6 (H)  4.4 - 11.3 x10*3/uL Final    nRBC 08/09/2024 0.0  0.0 - 0.0 /100 WBCs Final    RBC 08/09/2024 4.80  4.00 - 5.20 x10*6/uL Final    Hemoglobin 08/09/2024 14.8  12.0 - 16.0 g/dL Final    Hematocrit 08/09/2024 45.3  36.0 - 46.0 % Final    MCV 08/09/2024 94  80 - 100 fL Final    MCH 08/09/2024 30.8  26.0 - 34.0 pg Final    MCHC 08/09/2024 32.7  32.0 - 36.0 g/dL Final    RDW 08/09/2024 12.8  11.5 - 14.5 % Final    Platelets 08/09/2024 298  150 - 450 x10*3/uL Final    Neutrophils % 08/09/2024 79.6  40.0 - 80.0 % Final    Immature Granulocytes %, Automated 08/09/2024 0.5  0.0 - 0.9 % Final    Immature Granulocyte Count (IG) includes promyelocytes, myelocytes and metamyelocytes but does not include bands. Percent differential counts (%) should be interpreted in the context of the absolute cell counts (cells/UL).    Lymphocytes % 08/09/2024 14.0  13.0 - 44.0 % Final    Monocytes % 08/09/2024 5.5  2.0 - 10.0 % Final    Eosinophils % 08/09/2024 0.2  0.0  - 6.0 % Final    Basophils % 08/09/2024 0.2  0.0 - 2.0 % Final    Neutrophils Absolute 08/09/2024 10.06 (H)  1.20 - 7.70 x10*3/uL Final    Percent differential counts (%) should be interpreted in the context of the absolute cell counts (cells/uL).    Immature Granulocytes Absolute, Au* 08/09/2024 0.06  0.00 - 0.70 x10*3/uL Final    Lymphocytes Absolute 08/09/2024 1.77  1.20 - 4.80 x10*3/uL Final    Monocytes Absolute 08/09/2024 0.70  0.10 - 1.00 x10*3/uL Final    Eosinophils Absolute 08/09/2024 0.02  0.00 - 0.70 x10*3/uL Final    Basophils Absolute 08/09/2024 0.03  0.00 - 0.10 x10*3/uL Final    Hemoglobin A1C 08/09/2024 5.2  see below % Final    Estimated Average Glucose 08/09/2024 103  Not Established mg/dL Final       Physical Exam  Constitutional:       General: She is not in acute distress.     Appearance: Normal appearance. She is normal weight. She is not ill-appearing.   HENT:      Head: Normocephalic and atraumatic.      Right Ear: External ear normal.      Left Ear: External ear normal.      Nose: Nose normal. No congestion or rhinorrhea.      Mouth/Throat:      Mouth: Mucous membranes are moist.      Pharynx: Oropharynx is clear. No oropharyngeal exudate or posterior oropharyngeal erythema.   Eyes:      General: No scleral icterus.     Extraocular Movements: Extraocular movements intact.      Conjunctiva/sclera: Conjunctivae normal.      Pupils: Pupils are equal, round, and reactive to light.   Neck:      Comments: Marked decreased range of motion of the neck more prominent on the right side  Cardiovascular:      Rate and Rhythm: Normal rate and regular rhythm.      Pulses: Normal pulses.      Heart sounds: Normal heart sounds. No murmur heard.  Pulmonary:      Effort: Pulmonary effort is normal. No respiratory distress.      Breath sounds: Normal breath sounds. No wheezing, rhonchi or rales.   Abdominal:      General: Abdomen is flat. Bowel sounds are normal.      Palpations: Abdomen is soft.       Tenderness: There is no abdominal tenderness. There is no guarding or rebound.   Musculoskeletal:         General: Tenderness present. No deformity. Normal range of motion.      Cervical back: Rigidity and tenderness present.      Right lower leg: No edema.      Left lower leg: No edema.      Comments: Decreased range of motion upon abduction and posterior flexion of the right shoulder.  Empty test can is positive for the right arm, and negative for left arm   Skin:     General: Skin is warm and dry.      Capillary Refill: Capillary refill takes less than 2 seconds.      Findings: No lesion or rash.   Neurological:      General: No focal deficit present.      Mental Status: She is alert and oriented to person, place, and time. Mental status is at baseline.   Psychiatric:         Mood and Affect: Mood normal.         Behavior: Behavior normal.           Assessment/Plan   # Health maintenance  Complete history and physical examination was performed  EKG reveals normal sinus rhythm without acute changes  Requisition for screening hep C and HIV was provided today  We will notify of test results once available and make treatment recommendations accordingly   Requisition for mammogram was provided as well  Educated about the importance of conducting a healthy lifestyle, following well-balanced diet and exercising regularly    Other problems/diagnoses addressed and reviewed during this encounter     1.  Left knee pain  Stable.  Status post 3 cycles of 20 mg sodium hyaluronate, tolerated well  Discussed about possibility of exercising with stationary bike, as a way to not put much pressure on her knees     2. Benign essential hypertension  Stable, continue lisinopril 20 mg daily.  Blood pressure goal should be below 130/80, ideally 120/80  Please check your blood pressure at home and keep a log.  Please bring this log with you to your next appointment     3. Insomnia  Stable, we decreased the dose of trazodone to 100 mg  nightly.  New prescription was sent to your pharmacy     4. Vitamin D deficiency  Refills of vitamin D supplements were sent to the local pharmacy     5. Migraines  Refills of the Ubrelvy 50 mg were sent to your local pharmacy  Please continue keeping us in the loop on how you are feeling  Stable without any changes     6.  Multiple Sclerosis complicated with Right optic nerve neuritis  Continue to use Kesimpta Pen q 4 weeks and follow regularly with Neuro-Ophtalmology  Situation is improving close to baseline.    7.  Neck pain/right shoulder pain  Situation has not improved despite being treated with NSAID, gabapentin, Flexeril, and in-home exercises.  Clinical presentation and physical exam more consistent with supraspinatus tear  Referral for physical therapy was provided today.  Will continue to monitor closely, considering referral to Ortho if symptoms do not resolve.    8.  Obesity/weight gain  Discussed in length about recent weight gain, and different causes.  Obvious being not able to increase physical activity, as well as being on menopause are not helping in this case.  We strongly encourage, and discussed using a stationary bike to increase physical activity, and following a low calorie diet is major measurements to lose weight.  Will also start a trial of Wegovy, and monitor efficacy/tolerability.  If well-tolerated plan to increase the dose next month and reevaluate in 3 months.    9.  Mixed hyperlipidemia  ASCVD risk score is reassuring for the moment.  Patient does not meet criteria to start any statins.  Educated about importance to follow a low-fat diet  Requisition for CT cardiac calcium score was provided today    Thank you for letting us be a part of your care team.  Please call the office if you have further questions or concerns regarding your care    Otherwise, please follow-up in 3 months for continued care and refills     Ines Moore MD  PGY2, FM Resident

## 2024-08-20 ENCOUNTER — APPOINTMENT (OUTPATIENT)
Dept: PRIMARY CARE | Facility: CLINIC | Age: 49
End: 2024-08-20
Payer: COMMERCIAL

## 2024-08-20 VITALS
SYSTOLIC BLOOD PRESSURE: 114 MMHG | OXYGEN SATURATION: 98 % | WEIGHT: 210 LBS | HEIGHT: 67 IN | BODY MASS INDEX: 32.96 KG/M2 | HEART RATE: 92 BPM | DIASTOLIC BLOOD PRESSURE: 80 MMHG

## 2024-08-20 DIAGNOSIS — M79.2 NERVE PAIN: ICD-10-CM

## 2024-08-20 DIAGNOSIS — G47.00 INSOMNIA, UNSPECIFIED TYPE: ICD-10-CM

## 2024-08-20 DIAGNOSIS — M17.12 ARTHRITIS OF KNEE, LEFT: ICD-10-CM

## 2024-08-20 DIAGNOSIS — E78.2 MIXED HYPERLIPIDEMIA: ICD-10-CM

## 2024-08-20 DIAGNOSIS — G43.109 MIGRAINE WITH AURA AND WITHOUT STATUS MIGRAINOSUS, NOT INTRACTABLE: ICD-10-CM

## 2024-08-20 DIAGNOSIS — Z00.00 HEALTHCARE MAINTENANCE: Primary | ICD-10-CM

## 2024-08-20 DIAGNOSIS — Z12.31 ENCOUNTER FOR SCREENING MAMMOGRAM FOR MALIGNANT NEOPLASM OF BREAST: ICD-10-CM

## 2024-08-20 DIAGNOSIS — M25.511 ACUTE PAIN OF RIGHT SHOULDER: ICD-10-CM

## 2024-08-20 DIAGNOSIS — G47.09 OTHER INSOMNIA: ICD-10-CM

## 2024-08-20 DIAGNOSIS — E55.9 VITAMIN D DEFICIENCY: ICD-10-CM

## 2024-08-20 DIAGNOSIS — M25.562 CHRONIC PAIN OF LEFT KNEE: ICD-10-CM

## 2024-08-20 DIAGNOSIS — I10 PRIMARY HYPERTENSION: ICD-10-CM

## 2024-08-20 DIAGNOSIS — D72.819 LEUKOPENIA, UNSPECIFIED TYPE: ICD-10-CM

## 2024-08-20 DIAGNOSIS — E66.9 OBESITY (BMI 30.0-34.9): ICD-10-CM

## 2024-08-20 DIAGNOSIS — G35 MULTIPLE SCLEROSIS (MULTI): ICD-10-CM

## 2024-08-20 DIAGNOSIS — G89.29 CHRONIC PAIN OF LEFT KNEE: ICD-10-CM

## 2024-08-20 PROCEDURE — 99215 OFFICE O/P EST HI 40 MIN: CPT | Performed by: STUDENT IN AN ORGANIZED HEALTH CARE EDUCATION/TRAINING PROGRAM

## 2024-08-20 PROCEDURE — 3074F SYST BP LT 130 MM HG: CPT | Performed by: STUDENT IN AN ORGANIZED HEALTH CARE EDUCATION/TRAINING PROGRAM

## 2024-08-20 PROCEDURE — 3008F BODY MASS INDEX DOCD: CPT | Performed by: STUDENT IN AN ORGANIZED HEALTH CARE EDUCATION/TRAINING PROGRAM

## 2024-08-20 PROCEDURE — 99396 PREV VISIT EST AGE 40-64: CPT | Performed by: STUDENT IN AN ORGANIZED HEALTH CARE EDUCATION/TRAINING PROGRAM

## 2024-08-20 PROCEDURE — 93000 ELECTROCARDIOGRAM COMPLETE: CPT | Performed by: STUDENT IN AN ORGANIZED HEALTH CARE EDUCATION/TRAINING PROGRAM

## 2024-08-20 PROCEDURE — 1036F TOBACCO NON-USER: CPT | Performed by: STUDENT IN AN ORGANIZED HEALTH CARE EDUCATION/TRAINING PROGRAM

## 2024-08-20 PROCEDURE — 3079F DIAST BP 80-89 MM HG: CPT | Performed by: STUDENT IN AN ORGANIZED HEALTH CARE EDUCATION/TRAINING PROGRAM

## 2024-08-20 RX ORDER — LISINOPRIL 20 MG/1
20 TABLET ORAL DAILY
Qty: 90 TABLET | Refills: 1 | Status: SHIPPED | OUTPATIENT
Start: 2024-08-20

## 2024-08-20 RX ORDER — GABAPENTIN 300 MG/1
300 CAPSULE ORAL 3 TIMES DAILY
Qty: 30 CAPSULE | Refills: 1 | Status: SHIPPED | OUTPATIENT
Start: 2024-08-20

## 2024-08-20 RX ORDER — NAPROXEN 500 MG/1
500 TABLET ORAL 2 TIMES DAILY PRN
Qty: 60 TABLET | Refills: 0 | Status: SHIPPED | OUTPATIENT
Start: 2024-08-20

## 2024-08-20 RX ORDER — TRAZODONE HYDROCHLORIDE 100 MG/1
100 TABLET ORAL NIGHTLY
Qty: 90 TABLET | Refills: 1 | Status: SHIPPED | OUTPATIENT
Start: 2024-08-20

## 2024-08-20 RX ORDER — ERGOCALCIFEROL 1.25 MG/1
1 CAPSULE ORAL
Qty: 12 CAPSULE | Refills: 1 | Status: SHIPPED | OUTPATIENT
Start: 2024-08-20

## 2024-08-20 RX ORDER — SEMAGLUTIDE 0.25 MG/.5ML
0.25 INJECTION, SOLUTION SUBCUTANEOUS
Qty: 2 ML | Refills: 0 | Status: SHIPPED | OUTPATIENT
Start: 2024-08-25 | End: 2024-09-16

## 2024-08-20 ASSESSMENT — PATIENT HEALTH QUESTIONNAIRE - PHQ9
2. FEELING DOWN, DEPRESSED OR HOPELESS: NOT AT ALL
1. LITTLE INTEREST OR PLEASURE IN DOING THINGS: NOT AT ALL
SUM OF ALL RESPONSES TO PHQ9 QUESTIONS 1 AND 2: 0

## 2024-08-23 ENCOUNTER — HOSPITAL ENCOUNTER (OUTPATIENT)
Dept: RADIOLOGY | Facility: CLINIC | Age: 49
Discharge: HOME | End: 2024-08-23
Payer: COMMERCIAL

## 2024-08-23 VITALS — WEIGHT: 210 LBS | BODY MASS INDEX: 32.96 KG/M2 | HEIGHT: 67 IN

## 2024-08-23 DIAGNOSIS — Z12.31 ENCOUNTER FOR SCREENING MAMMOGRAM FOR MALIGNANT NEOPLASM OF BREAST: ICD-10-CM

## 2024-08-23 PROCEDURE — 77067 SCR MAMMO BI INCL CAD: CPT

## 2024-08-26 ENCOUNTER — APPOINTMENT (OUTPATIENT)
Dept: PRIMARY CARE | Facility: CLINIC | Age: 49
End: 2024-08-26
Payer: COMMERCIAL

## 2024-10-10 DIAGNOSIS — G89.29 CHRONIC PAIN OF LEFT KNEE: ICD-10-CM

## 2024-10-10 DIAGNOSIS — M25.562 CHRONIC PAIN OF LEFT KNEE: ICD-10-CM

## 2024-10-10 RX ORDER — NAPROXEN 500 MG/1
TABLET ORAL
Qty: 60 TABLET | Refills: 0 | Status: SHIPPED | OUTPATIENT
Start: 2024-10-10

## 2024-10-14 DIAGNOSIS — G35 MULTIPLE SCLEROSIS (MULTI): ICD-10-CM

## 2024-10-17 RX ORDER — OFATUMUMAB 20 MG/.4ML
20 INJECTION, SOLUTION SUBCUTANEOUS
Qty: 0.4 ML | Refills: 2 | Status: SHIPPED | OUTPATIENT
Start: 2024-10-17

## 2024-12-10 ENCOUNTER — OFFICE VISIT (OUTPATIENT)
Dept: PRIMARY CARE | Facility: CLINIC | Age: 49
End: 2024-12-10
Payer: COMMERCIAL

## 2024-12-10 VITALS
HEART RATE: 98 BPM | SYSTOLIC BLOOD PRESSURE: 122 MMHG | BODY MASS INDEX: 33.12 KG/M2 | DIASTOLIC BLOOD PRESSURE: 84 MMHG | HEIGHT: 67 IN | WEIGHT: 211 LBS

## 2024-12-10 DIAGNOSIS — R09.81 SINUS CONGESTION: Primary | ICD-10-CM

## 2024-12-10 DIAGNOSIS — J34.89 NOSE IRRITATION: ICD-10-CM

## 2024-12-10 PROCEDURE — 99213 OFFICE O/P EST LOW 20 MIN: CPT | Performed by: STUDENT IN AN ORGANIZED HEALTH CARE EDUCATION/TRAINING PROGRAM

## 2024-12-10 PROCEDURE — 3008F BODY MASS INDEX DOCD: CPT | Performed by: STUDENT IN AN ORGANIZED HEALTH CARE EDUCATION/TRAINING PROGRAM

## 2024-12-10 PROCEDURE — 3074F SYST BP LT 130 MM HG: CPT | Performed by: STUDENT IN AN ORGANIZED HEALTH CARE EDUCATION/TRAINING PROGRAM

## 2024-12-10 PROCEDURE — 3079F DIAST BP 80-89 MM HG: CPT | Performed by: STUDENT IN AN ORGANIZED HEALTH CARE EDUCATION/TRAINING PROGRAM

## 2024-12-10 NOTE — PROGRESS NOTES
Subjective   Patient ID: Becky Mendiola is a 49 y.o. female who presents for Sinusitis.  Today she is accompanied by alone.     HPI  Presents today due to increased nasal irritation and dryness for the past month   Last month, she went to [place] for her 49th birthday and when she returned home her symptoms began   She notes increased tenderness and sensitivity of her nose with mild increase in congestion   States she has noticed cuts on the edge of her nose along with blood on a tissue when she blows her nose   Tried methanal rub, humidifier, OTC decongestant and Flonase, without relief   Denies accompanying systemic symptoms including headache, cough, fever and post nasal drainage, stating she does not feel sick   Has a history of allergies, which she takes OTC allergy medication     Current Outpatient Medications on File Prior to Visit   Medication Sig Dispense Refill    albuterol (ProAir HFA) 90 mcg/actuation inhaler Inhale 2 puffs every 4 hours if needed for wheezing or shortness of breath. 8.5 g 1    azelastine (Astelin) 137 mcg (0.1 %) nasal spray Administer 1 spray into each nostril 2 times a day. 30 mL 2    cyclobenzaprine (Flexeril) 5 mg tablet Take 1 tablet (5 mg) by mouth 3 times a day. 30 tablet 0    ergocalciferol (Vitamin D-2) 1.25 MG (02125 UT) capsule Take 1 capsule (1,250 mcg) by mouth 1 (one) time per week. 12 capsule 1    fluticasone (Flonase) 50 mcg/actuation nasal spray Administer 1 spray into each nostril 2 times a day. 16 g 1    gabapentin (Neurontin) 300 mg capsule Take 1 capsule (300 mg) by mouth 3 times a day. 30 capsule 1    lisinopril 20 mg tablet Take 1 tablet (20 mg) by mouth once daily. 90 tablet 1    methylPREDNISolone (Medrol Dospak) 4 mg tablets Take as directed on package. 21 tablet 0    naproxen (Naprosyn) 500 mg tablet TAKE 1 TABLET (500 MG) BY MOUTH 2 TIMES A DAY AS NEEDED FOR MILD PAIN (1 - 3 ON SCALE) 60 tablet 0    ofatumumab (Kesimpta Pen) 20 mg/0.4 mL injection Inject 0.4 mL  "(20 mg) under the skin every 28 (twenty-eight) days. 0.4 mL 2    semaglutide, weight loss, (Wegovy) 0.25 mg/0.5 mL pen injector Inject 0.25 mg under the skin 1 (one) time per week for 4 doses. 2 mL 0    traZODone (Desyrel) 100 mg tablet Take 1 tablet (100 mg) by mouth once daily at bedtime. 90 tablet 1    ubrogepant 50 mg tablet Take 1 tablet (50 mg) by mouth if needed (Migraines) for up to 1 dose. 15 tablet 3     No current facility-administered medications on file prior to visit.        Allergies   Allergen Reactions    Amoxicillin Unknown    Sulfa (Sulfonamide Antibiotics) Unknown       Immunization History   Administered Date(s) Administered    Influenza, seasonal, injectable 10/12/2020    Tdap vaccine, age 7 year and older (BOOSTRIX, ADACEL) 08/01/2017, 01/10/2023         Review of Systems  All pertinent positive symptoms are included in the history of present illness.  All other systems have been reviewed and are negative and noncontributory to this patient's current ailments.     Objective   /84   Pulse 98   Ht 1.702 m (5' 7\")   Wt 95.7 kg (211 lb)   BMI 33.05 kg/m²   BSA: 2.13 meters squared  No visits with results within 1 Month(s) from this visit.   Latest known visit with results is:   Lab on 08/09/2024   Component Date Value Ref Range Status    Thyroid Stimulating Hormone 08/09/2024 1.82  0.44 - 3.98 mIU/L Final    Cholesterol 08/09/2024 212 (H)  0 - 199 mg/dL Final          Age      Desirable   Borderline High   High     0-19 Y     0 - 169       170 - 199     >/= 200    20-24 Y     0 - 189       190 - 224     >/= 225         >24 Y     0 - 199       200 - 239     >/= 240   **All ranges are based on fasting samples. Specific   therapeutic targets will vary based on patient-specific   cardiac risk.    Pediatric guidelines reference:Pediatrics 2011, 128(S5).Adult guidelines reference: NCEP ATPIII Guidelines,BEBA 2001, 258:2486-97    Venipuncture immediately after or during the administration of " Metamizole may lead to falsely low results. Testing should be performed immediately prior to Metamizole dosing.    HDL-Cholesterol 08/09/2024 55.7  mg/dL Final      Age       Very Low   Low     Normal    High    0-19 Y    < 35      < 40     40-45     ----  20-24 Y    ----     < 40      >45      ----        >24 Y      ----     < 40     40-60      >60      Cholesterol/HDL Ratio 08/09/2024 3.8   Final      Ref Values  Desirable  < 3.4  High Risk  > 5.0    LDL Calculated 08/09/2024 132 (H)  <=99 mg/dL Final                                Near   Borderline      AGE      Desirable  Optimal    High     High     Very High     0-19 Y     0 - 109     ---    110-129   >/= 130     ----    20-24 Y     0 - 119     ---    120-159   >/= 160     ----      >24 Y     0 -  99   100-129  130-159   160-189     >/=190      VLDL 08/09/2024 24  0 - 40 mg/dL Final    Triglycerides 08/09/2024 120  0 - 149 mg/dL Final       Age         Desirable   Borderline High   High     Very High   0 D-90 D    19 - 174         ----         ----        ----  91 D- 9 Y     0 -  74        75 -  99     >/= 100      ----    10-19 Y     0 -  89        90 - 129     >/= 130      ----    20-24 Y     0 - 114       115 - 149     >/= 150      ----         >24 Y     0 - 149       150 - 199    200- 499    >/= 500    Venipuncture immediately after or during the administration of Metamizole may lead to falsely low results. Testing should be performed immediately prior to Metamizole dosing.    Non HDL Cholesterol 08/09/2024 156 (H)  0 - 149 mg/dL Final          Age       Desirable   Borderline High   High     Very High     0-19 Y     0 - 119       120 - 144     >/= 145    >/= 160    20-24 Y     0 - 149       150 - 189     >/= 190      ----         >24 Y    30 mg/dL above LDL Cholesterol goal      Glucose 08/09/2024 84  74 - 99 mg/dL Final    Sodium 08/09/2024 138  136 - 145 mmol/L Final    Potassium 08/09/2024 4.2  3.5 - 5.3 mmol/L Final    Chloride 08/09/2024 104  98 -  107 mmol/L Final    Bicarbonate 08/09/2024 24  21 - 32 mmol/L Final    Anion Gap 08/09/2024 14  10 - 20 mmol/L Final    Urea Nitrogen 08/09/2024 14  6 - 23 mg/dL Final    Creatinine 08/09/2024 0.70  0.50 - 1.05 mg/dL Final    eGFR 08/09/2024 >90  >60 mL/min/1.73m*2 Final    Calculations of estimated GFR are performed using the 2021 CKD-EPI Study Refit equation without the race variable for the IDMS-Traceable creatinine methods.  https://jasn.asnjournals.org/content/early/2021/09/22/ASN.2671741883    Calcium 08/09/2024 9.3  8.6 - 10.3 mg/dL Final    Albumin 08/09/2024 4.7  3.4 - 5.0 g/dL Final    Alkaline Phosphatase 08/09/2024 90  33 - 110 U/L Final    Total Protein 08/09/2024 7.3  6.4 - 8.2 g/dL Final    AST 08/09/2024 18  9 - 39 U/L Final    Bilirubin, Total 08/09/2024 0.5  0.0 - 1.2 mg/dL Final    ALT 08/09/2024 25  7 - 45 U/L Final    Patients treated with Sulfasalazine may generate falsely decreased results for ALT.    WBC 08/09/2024 12.6 (H)  4.4 - 11.3 x10*3/uL Final    nRBC 08/09/2024 0.0  0.0 - 0.0 /100 WBCs Final    RBC 08/09/2024 4.80  4.00 - 5.20 x10*6/uL Final    Hemoglobin 08/09/2024 14.8  12.0 - 16.0 g/dL Final    Hematocrit 08/09/2024 45.3  36.0 - 46.0 % Final    MCV 08/09/2024 94  80 - 100 fL Final    MCH 08/09/2024 30.8  26.0 - 34.0 pg Final    MCHC 08/09/2024 32.7  32.0 - 36.0 g/dL Final    RDW 08/09/2024 12.8  11.5 - 14.5 % Final    Platelets 08/09/2024 298  150 - 450 x10*3/uL Final    Neutrophils % 08/09/2024 79.6  40.0 - 80.0 % Final    Immature Granulocytes %, Automated 08/09/2024 0.5  0.0 - 0.9 % Final    Immature Granulocyte Count (IG) includes promyelocytes, myelocytes and metamyelocytes but does not include bands. Percent differential counts (%) should be interpreted in the context of the absolute cell counts (cells/UL).    Lymphocytes % 08/09/2024 14.0  13.0 - 44.0 % Final    Monocytes % 08/09/2024 5.5  2.0 - 10.0 % Final    Eosinophils % 08/09/2024 0.2  0.0 - 6.0 % Final    Basophils %  08/09/2024 0.2  0.0 - 2.0 % Final    Neutrophils Absolute 08/09/2024 10.06 (H)  1.20 - 7.70 x10*3/uL Final    Percent differential counts (%) should be interpreted in the context of the absolute cell counts (cells/uL).    Immature Granulocytes Absolute, Au* 08/09/2024 0.06  0.00 - 0.70 x10*3/uL Final    Lymphocytes Absolute 08/09/2024 1.77  1.20 - 4.80 x10*3/uL Final    Monocytes Absolute 08/09/2024 0.70  0.10 - 1.00 x10*3/uL Final    Eosinophils Absolute 08/09/2024 0.02  0.00 - 0.70 x10*3/uL Final    Basophils Absolute 08/09/2024 0.03  0.00 - 0.10 x10*3/uL Final    Hemoglobin A1C 08/09/2024 5.2  see below % Final    Estimated Average Glucose 08/09/2024 103  Not Established mg/dL Final       Physical Exam  CONSTITUTIONAL - well nourished, well developed, looks like stated age, in no acute distress, not ill-appearing, and not tired appearing  HEAD - no trauma, normocephalic  EYES - normal external exam  ENT - TM's intact, no injection, no signs of infection, uvula midline, normal tongue movement and throat normal, no exudate, erythematous nasal passage with dry skin without discharge and patent  CHEST - clear to auscultation, no wheezing, no crackles and no rales, good effort  CARDIAC - regular rate and regular rhythm, no skipped beats, no murmur  EXTREMITIES - no edema, no deformities  NEUROLOGICAL - alert, oriented and no focal signs  PSYCHIATRIC - alert, pleasant and cordial, age-appropriate  IMMUNOLOGIC - no cervical lymphadenopathy     Assessment/Plan   1. Sinus Congestion and Nasal Irritation   Discontinue methanal rub as this could be causing your skin and nose irritation/dryness    Recommend OTC nasal saline spray, which can provide add moisturize to your nasal passages  You can also use OTC hydrocortisone 1% cream on the outside of your nose to help with your dry skin alongside Vaseline or something similar  If your symptoms worsen or do not improve, we can place a referral to ENT       Please follow-up in  the next few months for continued care and refills

## 2024-12-11 ENCOUNTER — HOSPITAL ENCOUNTER (OUTPATIENT)
Dept: RADIOLOGY | Facility: CLINIC | Age: 49
Discharge: HOME | End: 2024-12-11
Payer: COMMERCIAL

## 2024-12-11 DIAGNOSIS — E78.2 MIXED HYPERLIPIDEMIA: ICD-10-CM

## 2024-12-11 PROCEDURE — 75571 CT HRT W/O DYE W/CA TEST: CPT

## 2024-12-12 NOTE — RESULT ENCOUNTER NOTE
CT cardiac score shows a value of 79 which does note that the patient is starting to develop plaque at this age    I recommend we continue monitoring her blood pressure closely as well as her cholesterol    There were also findings consistent with remote granulomatous exposure with calcified mediastinal and parenchymal granulomas    Usually this is not worrisome  At any point if she is noticing breathing issues it may be an idea to follow-up with a lung specialist to be fully thorough

## 2024-12-26 ENCOUNTER — LAB (OUTPATIENT)
Dept: LAB | Facility: LAB | Age: 49
End: 2024-12-26
Payer: COMMERCIAL

## 2024-12-26 ENCOUNTER — OFFICE VISIT (OUTPATIENT)
Dept: NEUROLOGY | Facility: CLINIC | Age: 49
End: 2024-12-26
Payer: COMMERCIAL

## 2024-12-26 VITALS
SYSTOLIC BLOOD PRESSURE: 98 MMHG | HEART RATE: 81 BPM | RESPIRATION RATE: 18 BRPM | DIASTOLIC BLOOD PRESSURE: 63 MMHG | WEIGHT: 209 LBS | BODY MASS INDEX: 32.73 KG/M2

## 2024-12-26 DIAGNOSIS — G35 MULTIPLE SCLEROSIS (MULTI): ICD-10-CM

## 2024-12-26 DIAGNOSIS — G35 MULTIPLE SCLEROSIS (MULTI): Primary | ICD-10-CM

## 2024-12-26 DIAGNOSIS — D84.821 IMMUNODEFICIENCY DUE TO DRUG THERAPY (MULTI): ICD-10-CM

## 2024-12-26 DIAGNOSIS — Z79.899 IMMUNODEFICIENCY DUE TO DRUG THERAPY (MULTI): ICD-10-CM

## 2024-12-26 LAB
BASOPHILS # BLD AUTO: 0.04 X10*3/UL (ref 0–0.1)
BASOPHILS NFR BLD AUTO: 0.5 %
EOSINOPHIL # BLD AUTO: 0.23 X10*3/UL (ref 0–0.7)
EOSINOPHIL NFR BLD AUTO: 2.6 %
ERYTHROCYTE [DISTWIDTH] IN BLOOD BY AUTOMATED COUNT: 12 % (ref 11.5–14.5)
HCT VFR BLD AUTO: 43.9 % (ref 36–46)
HGB BLD-MCNC: 14.3 G/DL (ref 12–16)
IMM GRANULOCYTES # BLD AUTO: 0.02 X10*3/UL (ref 0–0.7)
IMM GRANULOCYTES NFR BLD AUTO: 0.2 % (ref 0–0.9)
LYMPHOCYTES # BLD AUTO: 1.89 X10*3/UL (ref 1.2–4.8)
LYMPHOCYTES NFR BLD AUTO: 21.7 %
MCH RBC QN AUTO: 30.8 PG (ref 26–34)
MCHC RBC AUTO-ENTMCNC: 32.6 G/DL (ref 32–36)
MCV RBC AUTO: 95 FL (ref 80–100)
MONOCYTES # BLD AUTO: 0.67 X10*3/UL (ref 0.1–1)
MONOCYTES NFR BLD AUTO: 7.7 %
NEUTROPHILS # BLD AUTO: 5.87 X10*3/UL (ref 1.2–7.7)
NEUTROPHILS NFR BLD AUTO: 67.3 %
NRBC BLD-RTO: 0 /100 WBCS (ref 0–0)
PLATELET # BLD AUTO: 265 X10*3/UL (ref 150–450)
RBC # BLD AUTO: 4.64 X10*6/UL (ref 4–5.2)
WBC # BLD AUTO: 8.7 X10*3/UL (ref 4.4–11.3)

## 2024-12-26 PROCEDURE — 82784 ASSAY IGA/IGD/IGG/IGM EACH: CPT

## 2024-12-26 PROCEDURE — 88185 FLOWCYTOMETRY/TC ADD-ON: CPT

## 2024-12-26 PROCEDURE — 99214 OFFICE O/P EST MOD 30 MIN: CPT | Performed by: NURSE PRACTITIONER

## 2024-12-26 PROCEDURE — 3074F SYST BP LT 130 MM HG: CPT | Performed by: NURSE PRACTITIONER

## 2024-12-26 PROCEDURE — 1036F TOBACCO NON-USER: CPT | Performed by: NURSE PRACTITIONER

## 2024-12-26 PROCEDURE — 80076 HEPATIC FUNCTION PANEL: CPT

## 2024-12-26 PROCEDURE — 3078F DIAST BP <80 MM HG: CPT | Performed by: NURSE PRACTITIONER

## 2024-12-26 PROCEDURE — 85025 COMPLETE CBC W/AUTO DIFF WBC: CPT

## 2024-12-26 PROCEDURE — 88184 FLOWCYTOMETRY/ TC 1 MARKER: CPT

## 2024-12-26 ASSESSMENT — VISUAL ACUITY: METHOD_CF: 1

## 2024-12-26 ASSESSMENT — PAIN SCALES - GENERAL: PAINLEVEL_OUTOF10: 0-NO PAIN

## 2024-12-26 NOTE — PROGRESS NOTES
Expand All Collapse All    Patient name: Becky Mendiola  Onset: ?2008  Diagnosis of MS: 2008 by MRI and LP (Dr Hough)  Disease course at onset: RR  Current disease course: RR  Previous disease therapies:   - Copaxone briefly  - Rebif stopped in 2017 because she ran out  Current disease therapies: none  Most recent MRI brain: 1/2019 stable  Most recent MRI cervical spine: 8/2016, mild consistent with MS stable vs 2014  Most recent MRI thoracic spine: 8/2016, ?T9 cord lesion  CSF: remote reportedly pos for OCB  JCV serology and date: none     Subjective  Becky Mendiola is a 49 y.o. right-handed female here for a follow up of her MS, she takes Kesimpta and tolerating well. She was hospitalized Jan. 2024 after reporting blurred vision. Last MRI Jan. 2024 and showed enhancement right pre chiasmatic optic nerve with enhancement extending towards and potentially minimally involving anterior aspect of the optic chiasm.  She reports she is doing well, but does reports trouble swallowing.    Multiple Sclerosis      No bowel or bladder issues, stable gait, no recent infections, reports trouble swallowing.    Objective   Neurological Exam  Mental Status  Alert. Oriented to person, place and time. Oriented to person, place, and time. Recent and remote memory are intact. Speech is normal. Language is fluent with no aphasia. Attention and concentration are normal.    Cranial Nerves  CN I: Sense of smell is normal.  CN II: Right visual acuity: Counts fingers. Left visual acuity: Counts fingers. Right normal visual field. Left normal visual field. Right funduscopic exam: disc intact. Left funduscopic exam: disc intact.  CN III, IV, VI: Extraocular movements intact bilaterally. Normal lids and orbits bilaterally. Pupils equal round and reactive to light bilaterally.  CN V: Facial sensation is normal.  CN VII: Full and symmetric facial movement.  CN VIII: Hearing is normal.  CN IX, X: Palate elevates symmetrically  CN XI: Shoulder shrug  strength is normal.  CN XII: Tongue midline without atrophy or fasciculations.    Motor  Normal muscle bulk throughout. Normal muscle tone.                                               Right                     Left  Hip flexion                              5-                          4-  Hip extension                         5-                          4-  Hip abduction                         5-                           Hip adduction                         5-                          4-  Knee flexion                           5-                          4-    Sensory  Light touch is normal in upper and lower extremities.     Reflexes                                            Right                      Left  Brachioradialis                    2+                         2+  Biceps                                 2+                         2+  Triceps                                2+                         2+  Patellar                                3+                         2+   Right palmar grasp present. Left palmar grasp present.    Coordination    Finger-to-nose, rapid alternating movements and heel-to-shin normal bilaterally without dysmetria.  9 hole peg test: Right hand 16.7 sec. Left hand 21.8 sec..    Gait  Normal casual, toe, heel and tandem gait. Timed get up and go test: 7 seconds.    Physical Exam  Constitutional:       Appearance: Normal appearance.   HENT:      Head: Normocephalic.      Nose: Nose normal.      Mouth/Throat:      Mouth: Mucous membranes are moist.   Eyes:      General: Lids are normal.      Extraocular Movements: Extraocular movements intact.      Pupils: Pupils are equal, round, and reactive to light.   Pulmonary:      Effort: Pulmonary effort is normal.   Musculoskeletal:      Cervical back: Full passive range of motion without pain and normal range of motion.   Skin:     General: Skin is warm and dry.   Neurological:      Mental Status: She is alert and oriented to person,  place, and time.      Coordination: Coordination is intact.      Deep Tendon Reflexes:      Reflex Scores:       Tricep reflexes are 2+ on the right side and 2+ on the left side.       Bicep reflexes are 2+ on the right side and 2+ on the left side.       Brachioradialis reflexes are 2+ on the right side and 2+ on the left side.       Patellar reflexes are 3+ on the right side and 2+ on the left side.  Psychiatric:         Attention and Perception: Attention normal.         Mood and Affect: Mood normal.         Speech: Speech normal.         Behavior: Behavior normal. Behavior is cooperative.         Thought Content: Thought content normal.         Cognition and Memory: Cognition normal.         Judgment: Judgment normal.       Provider Impression  Becky Mendiola is a 49 y.o. right-handed female here for a follow up of her MS, she takes Kesimpta and tolerating well. She was hospitalized Jan. 2024 after reporting blurred vision. Last MRI Jan. 2024 and showed enhancement right pre chiasmatic optic nerve with enhancement extending towards and potentially minimally involving anterior aspect of the optic chiasm.      We discussed the importance of living healthy life style with diet and exercise and the importance of V-D in patient's with MS.    The total face to face appointment was 45 minutes and more than 50% of the visit was spent counseling and coordination of care.    I personally reviewed laboratory, radiographic, and medical studies which were pertinent for today's visit.    Plan  - Continue Kesimpta.  - Labs today.  - MRI Jan. 2025.  - Referral to swallow evaluation.  - Follow up 6 months,

## 2024-12-27 LAB
ALBUMIN SERPL BCP-MCNC: 4.4 G/DL (ref 3.4–5)
ALP SERPL-CCNC: 98 U/L (ref 33–110)
ALT SERPL W P-5'-P-CCNC: 39 U/L (ref 7–45)
AST SERPL W P-5'-P-CCNC: 21 U/L (ref 9–39)
BASOPHILS # BLD AUTO: 0.04 X10*3/UL (ref 0–0.1)
BASOPHILS NFR BLD AUTO: 0.5 %
BILIRUB DIRECT SERPL-MCNC: 0.1 MG/DL (ref 0–0.3)
BILIRUB SERPL-MCNC: 0.6 MG/DL (ref 0–1.2)
EOSINOPHIL # BLD AUTO: 0.26 X10*3/UL (ref 0–0.7)
EOSINOPHIL NFR BLD AUTO: 3 %
ERYTHROCYTE [DISTWIDTH] IN BLOOD BY AUTOMATED COUNT: 12.1 % (ref 11.5–14.5)
HCT VFR BLD AUTO: 43.7 % (ref 36–46)
HGB BLD-MCNC: 14.2 G/DL (ref 12–16)
IGA SERPL-MCNC: 194 MG/DL (ref 70–400)
IGG SERPL-MCNC: 885 MG/DL (ref 700–1600)
IGM SERPL-MCNC: 91 MG/DL (ref 40–230)
IMM GRANULOCYTES # BLD AUTO: 0.04 X10*3/UL (ref 0–0.7)
IMM GRANULOCYTES NFR BLD AUTO: 0.5 % (ref 0–0.9)
LYMPHOCYTES # BLD AUTO: 1.84 X10*3/UL (ref 1.2–4.8)
LYMPHOCYTES NFR BLD AUTO: 21.2 %
MCH RBC QN AUTO: 30.4 PG (ref 26–34)
MCHC RBC AUTO-ENTMCNC: 32.5 G/DL (ref 32–36)
MCV RBC AUTO: 94 FL (ref 80–100)
MONOCYTES # BLD AUTO: 0.73 X10*3/UL (ref 0.1–1)
MONOCYTES NFR BLD AUTO: 8.4 %
NEUTROPHILS # BLD AUTO: 5.76 X10*3/UL (ref 1.2–7.7)
NEUTROPHILS NFR BLD AUTO: 66.4 %
NRBC BLD-RTO: 0 /100 WBCS (ref 0–0)
PLATELET # BLD AUTO: 248 X10*3/UL (ref 150–450)
PROT SERPL-MCNC: 6.9 G/DL (ref 6.4–8.2)
RBC # BLD AUTO: 4.67 X10*6/UL (ref 4–5.2)
WBC # BLD AUTO: 8.7 X10*3/UL (ref 4.4–11.3)

## 2024-12-29 LAB
CD19 CELLS # BLD: 0 X10E9/L
CD19 CELLS NFR BLD: 0 %
FLOW CYTOMETRY SPECIALIST REVIEW: ABNORMAL
LYMPHOCYTES # SPEC AUTO: 1.84 X10*3/UL
PATH REVIEW, B CELL PHENOTYPING, EXTENDED: ABNORMAL

## 2025-01-08 ENCOUNTER — DOCUMENTATION (OUTPATIENT)
Dept: SPEECH THERAPY | Facility: HOSPITAL | Age: 50
End: 2025-01-08
Payer: COMMERCIAL

## 2025-01-08 ENCOUNTER — APPOINTMENT (OUTPATIENT)
Dept: SPEECH THERAPY | Facility: HOSPITAL | Age: 50
End: 2025-01-08

## 2025-01-08 NOTE — PROGRESS NOTES
Speech-Language Pathology                 Therapy Communication Note    Patient Name: Becky Mendiola  MRN: [id number]  Department:   Room: Room/bed info not found  Today's Date: 1/8/2025     Discipline: Speech Language Pathology    Missed Visit Reason:  Cancel    Comment: Pt rescheduled her ST evaluation for a different day.

## 2025-01-15 ENCOUNTER — OFFICE VISIT (OUTPATIENT)
Dept: PRIMARY CARE | Facility: CLINIC | Age: 50
End: 2025-01-15
Payer: COMMERCIAL

## 2025-01-15 VITALS — SYSTOLIC BLOOD PRESSURE: 116 MMHG | DIASTOLIC BLOOD PRESSURE: 80 MMHG | HEART RATE: 102 BPM | OXYGEN SATURATION: 97 %

## 2025-01-15 DIAGNOSIS — N30.00 ACUTE CYSTITIS WITHOUT HEMATURIA: Primary | ICD-10-CM

## 2025-01-15 LAB
APPEARANCE UR: ABNORMAL
BILIRUB UR STRIP.AUTO-MCNC: NEGATIVE MG/DL
COLOR UR: ABNORMAL
GLUCOSE UR STRIP.AUTO-MCNC: NORMAL MG/DL
KETONES UR STRIP.AUTO-MCNC: NEGATIVE MG/DL
LEUKOCYTE ESTERASE UR QL STRIP.AUTO: NEGATIVE
NITRITE UR QL STRIP.AUTO: NEGATIVE
PH UR STRIP.AUTO: 5.5 [PH]
POC APPEARANCE, URINE: CLEAR
POC BILIRUBIN, URINE: NEGATIVE
POC BLOOD, URINE: NEGATIVE
POC COLOR, URINE: YELLOW
POC GLUCOSE, URINE: NEGATIVE MG/DL
POC KETONES, URINE: NEGATIVE MG/DL
POC LEUKOCYTES, URINE: NEGATIVE
POC NITRITE,URINE: NEGATIVE
POC PH, URINE: 6 PH
POC PROTEIN, URINE: NEGATIVE MG/DL
POC SPECIFIC GRAVITY, URINE: 1.01
POC UROBILINOGEN, URINE: 0.2 EU/DL
PROT UR STRIP.AUTO-MCNC: NEGATIVE MG/DL
RBC # UR STRIP.AUTO: NEGATIVE /UL
SP GR UR STRIP.AUTO: 1.02
UROBILINOGEN UR STRIP.AUTO-MCNC: NORMAL MG/DL

## 2025-01-15 PROCEDURE — 1036F TOBACCO NON-USER: CPT | Performed by: STUDENT IN AN ORGANIZED HEALTH CARE EDUCATION/TRAINING PROGRAM

## 2025-01-15 PROCEDURE — 3079F DIAST BP 80-89 MM HG: CPT | Performed by: STUDENT IN AN ORGANIZED HEALTH CARE EDUCATION/TRAINING PROGRAM

## 2025-01-15 PROCEDURE — 81002 URINALYSIS NONAUTO W/O SCOPE: CPT | Performed by: STUDENT IN AN ORGANIZED HEALTH CARE EDUCATION/TRAINING PROGRAM

## 2025-01-15 PROCEDURE — 87086 URINE CULTURE/COLONY COUNT: CPT

## 2025-01-15 PROCEDURE — 99213 OFFICE O/P EST LOW 20 MIN: CPT | Performed by: STUDENT IN AN ORGANIZED HEALTH CARE EDUCATION/TRAINING PROGRAM

## 2025-01-15 PROCEDURE — 81003 URINALYSIS AUTO W/O SCOPE: CPT

## 2025-01-15 PROCEDURE — 3074F SYST BP LT 130 MM HG: CPT | Performed by: STUDENT IN AN ORGANIZED HEALTH CARE EDUCATION/TRAINING PROGRAM

## 2025-01-15 NOTE — RESULT ENCOUNTER NOTE
Urinalysis did not show any test findings showing that this is a urinary tract infection    Please follow-up with OB/GYN as discussed but otherwise we will continue monitoring closely and wait for the urine culture

## 2025-01-15 NOTE — PROGRESS NOTES
Subjective   Patient ID: Becky Mendiola is a 49 y.o. female who presents for UTI.    HPI   UTI symptoms  Patient present today in office with concern of recent UTI symptoms.  Stated her symptoms started 2 days ago with mild burning with urination and pain with wiping.  Patient denies any increased frequency or urinary urgency, denies any fever or chills, denies any suprapubic tenderness or fullness, as well as no flank or costovertebral tenderness.  Patient admits that she often gets UTI and these are initial symptoms of progressing UTI as it has happened in the past.  She admits of increasing fluid intake and willing to try some cranberry juice  Denies any constipation    Review of Systems  All pertinent positive symptoms are included in the history of present illness.    All other systems have been reviewed and are negative and noncontributory to this patient's current ailments.    Objective   /80 (BP Location: Left arm, Patient Position: Sitting, BP Cuff Size: Adult)   Pulse 102   SpO2 97%     Physical Exam  CONSTITUTIONAL - well nourished, well developed, looks like stated age, in no acute distress, not ill-appearing, and not tired appearing  SKIN - normal skin color and pigmentation, normal skin turgor without rash, lesions, or nodules visualized  HEAD - no trauma, normocephalic  EYES - normal external exam  CHEST -no distressed breathing, good effort  ABDOMINAL -negative for flank or CVA tenderness, negative for suprapubic tenderness  EXTREMITIES - no edema, no deformities  NEUROLOGICAL - normal balance, normal motor, no ataxia  PSYCHIATRIC - alert, pleasant and cordial, age-appropriate      Assessment/Plan   Diagnoses and all orders for this visit:  Acute cystitis without hematuria  -     POCT UA (nonautomated) manually resulted  -     Urinalysis with Reflex Microscopic; Future  -     Urine Culture; Future  POCT UA performed in office was unremarkable  Ordered UA with reflex microscopic and culture to  rule out UTI  Will review results and make treatment commendation accordingly  If positive will order empiric Macrobid  Risks, benefits, and options of medication(s) above were discussed with instructions on the medication usage for your infection  I encouraged supportive care such as rest, fluids and Advil/Tylenol as warranted  Urine was sent for culture to ensure proper medication was prescribed for your particular type of infection  RTC in 3-5 days or sooner if symptoms worsen or persist      Thank you for letting us be a part of your care team.  Please call the office if you have further questions or concerns regarding your care    Ines Moore MD  PGY2, FM Resident

## 2025-01-16 LAB — BACTERIA UR CULT: NORMAL

## 2025-01-17 ENCOUNTER — APPOINTMENT (OUTPATIENT)
Dept: RADIOLOGY | Facility: CLINIC | Age: 50
End: 2025-01-17
Payer: COMMERCIAL

## 2025-01-19 DIAGNOSIS — G35 MULTIPLE SCLEROSIS (MULTI): ICD-10-CM

## 2025-01-20 RX ORDER — OFATUMUMAB 20 MG/.4ML
20 INJECTION, SOLUTION SUBCUTANEOUS
Qty: 0.4 ML | Refills: 5 | Status: SHIPPED | OUTPATIENT
Start: 2025-01-20

## 2025-01-24 ENCOUNTER — APPOINTMENT (OUTPATIENT)
Dept: RADIOLOGY | Facility: CLINIC | Age: 50
End: 2025-01-24
Payer: COMMERCIAL

## 2025-01-31 ENCOUNTER — APPOINTMENT (OUTPATIENT)
Dept: RADIOLOGY | Facility: CLINIC | Age: 50
End: 2025-01-31
Payer: COMMERCIAL

## 2025-02-03 DIAGNOSIS — G35 MULTIPLE SCLEROSIS (MULTI): ICD-10-CM

## 2025-02-03 RX ORDER — OFATUMUMAB 20 MG/.4ML
20 INJECTION, SOLUTION SUBCUTANEOUS
Qty: 0.4 ML | Refills: 5 | Status: SHIPPED | OUTPATIENT
Start: 2025-02-03

## 2025-02-12 ENCOUNTER — APPOINTMENT (OUTPATIENT)
Dept: SPEECH THERAPY | Facility: HOSPITAL | Age: 50
End: 2025-02-12

## 2025-02-19 DIAGNOSIS — G47.00 INSOMNIA, UNSPECIFIED TYPE: ICD-10-CM

## 2025-02-19 RX ORDER — TRAZODONE HYDROCHLORIDE 100 MG/1
100 TABLET ORAL NIGHTLY
Qty: 30 TABLET | Refills: 0 | Status: SHIPPED | OUTPATIENT
Start: 2025-02-19

## 2025-02-21 ENCOUNTER — OFFICE VISIT (OUTPATIENT)
Dept: OBSTETRICS AND GYNECOLOGY | Facility: CLINIC | Age: 50
End: 2025-02-21
Payer: COMMERCIAL

## 2025-02-21 VITALS
WEIGHT: 205 LBS | SYSTOLIC BLOOD PRESSURE: 128 MMHG | BODY MASS INDEX: 32.18 KG/M2 | DIASTOLIC BLOOD PRESSURE: 56 MMHG | HEIGHT: 67 IN

## 2025-02-21 DIAGNOSIS — Z01.419 ENCOUNTER FOR ANNUAL ROUTINE GYNECOLOGICAL EXAMINATION: Primary | ICD-10-CM

## 2025-02-21 DIAGNOSIS — N94.10 DYSPAREUNIA IN FEMALE: ICD-10-CM

## 2025-02-21 DIAGNOSIS — N95.1 MENOPAUSAL SYMPTOMS: ICD-10-CM

## 2025-02-21 LAB
POC BLOOD, URINE: NEGATIVE
POC GLUCOSE, URINE: NEGATIVE MG/DL
POC KETONES, URINE: NEGATIVE MG/DL
POC LEUKOCYTES, URINE: NEGATIVE
POC NITRITE,URINE: NEGATIVE
POC PROTEIN, URINE: NEGATIVE MG/DL

## 2025-02-21 PROCEDURE — 99396 PREV VISIT EST AGE 40-64: CPT | Performed by: OBSTETRICS & GYNECOLOGY

## 2025-02-21 PROCEDURE — 3008F BODY MASS INDEX DOCD: CPT | Performed by: OBSTETRICS & GYNECOLOGY

## 2025-02-21 PROCEDURE — 3074F SYST BP LT 130 MM HG: CPT | Performed by: OBSTETRICS & GYNECOLOGY

## 2025-02-21 PROCEDURE — 81003 URINALYSIS AUTO W/O SCOPE: CPT | Mod: QW | Performed by: OBSTETRICS & GYNECOLOGY

## 2025-02-21 PROCEDURE — 3078F DIAST BP <80 MM HG: CPT | Performed by: OBSTETRICS & GYNECOLOGY

## 2025-02-21 RX ORDER — ESTRADIOL 10 UG/1
10 TABLET, FILM COATED VAGINAL 2 TIMES WEEKLY
Qty: 18 TABLET | Refills: 3 | Status: SHIPPED | OUTPATIENT
Start: 2025-02-24

## 2025-02-21 ASSESSMENT — ENCOUNTER SYMPTOMS
RESPIRATORY NEGATIVE: 0
CARDIOVASCULAR NEGATIVE: 0
GASTROINTESTINAL NEGATIVE: 0
CONSTITUTIONAL NEGATIVE: 0
DEPRESSION: 0
LOSS OF SENSATION IN FEET: 0
ENDOCRINE NEGATIVE: 0
EYES NEGATIVE: 0
PSYCHIATRIC NEGATIVE: 0
OCCASIONAL FEELINGS OF UNSTEADINESS: 0
MUSCULOSKELETAL NEGATIVE: 0
HEMATOLOGIC/LYMPHATIC NEGATIVE: 0
NEUROLOGICAL NEGATIVE: 0
ALLERGIC/IMMUNOLOGIC NEGATIVE: 0

## 2025-02-21 ASSESSMENT — SOCIAL DETERMINANTS OF HEALTH (SDOH)
WITHIN THE LAST YEAR, HAVE YOU BEEN HUMILIATED OR EMOTIONALLY ABUSED IN OTHER WAYS BY YOUR PARTNER OR EX-PARTNER?: NO
WITHIN THE LAST YEAR, HAVE YOU BEEN KICKED, HIT, SLAPPED, OR OTHERWISE PHYSICALLY HURT BY YOUR PARTNER OR EX-PARTNER?: NO
WITHIN THE LAST YEAR, HAVE YOU BEEN AFRAID OF YOUR PARTNER OR EX-PARTNER?: NO
WITHIN THE LAST YEAR, HAVE TO BEEN RAPED OR FORCED TO HAVE ANY KIND OF SEXUAL ACTIVITY BY YOUR PARTNER OR EX-PARTNER?: NO

## 2025-02-21 ASSESSMENT — PAIN SCALES - GENERAL: PAINLEVEL_OUTOF10: 0-NO PAIN

## 2025-02-21 NOTE — PROGRESS NOTES
Subjective   Patient ID: Becky Mendiola is a 49 y.o. female who presents for Annual Exam (Patient has no pain or falls, no complaints or concerns last pap 23 wnl HPV negative last mammogram 24 benign no chaperone needed).  HPI  Patient is a 49-year-old female  1 para 0 here for her annual exam.  She was perimenopause on her last visit and was essentially amenorrheic over the past year.  She did have 2 days of spotting at her 49th birthday but otherwise is without bleeding.  Does have some hot flashes.  Coping without significant problems.  Does have a history of UTIs and some urinary symptoms  Also complaining of increasing dyspareunia especially postcoital.  Does use a lubricant but still with deep pain.    She is up-to-date on her mammogram and Pap test  Review of Systems   Genitourinary:  Positive for dyspareunia.   All other systems reviewed and are negative.  Hot flashes    Objective   Physical Exam  Thyroid: No thyroid megaly    Cardiovascular: Regular rate and rhythm    Lungs: Clear to auscultation    Breasts: No skin changes no masses palpated    Abdomen: Soft nontender bowel sounds positive no masses palpated    Extremities nontender no edema    Pelvic exam: External genitalia [place]th[place]'s urethra and [place]'s are normal.  Vaginal exam shows no lesions or discharge.  Pelvic bimanual exam reveals no masses or tenderness.  Assessment/Plan   Normal annual physical exam    Pap test due in     Mild vaginal atrophy and deep dyspareunia that lingers for a day or 2 afterward.  History of some urinary symptoms as well postcoital.  Discussed with patient options and very much would like to use vaginal estrogen.  Vagifem twice a week called in.  She is aware that I will take 2 to 3 months to notice significant changes    Follow-up in 1 year or as needed         Maicol Rm MD 25 9:51 AM

## 2025-02-28 ENCOUNTER — HOSPITAL ENCOUNTER (OUTPATIENT)
Dept: RADIOLOGY | Facility: CLINIC | Age: 50
Discharge: HOME | End: 2025-02-28
Payer: COMMERCIAL

## 2025-02-28 VITALS — WEIGHT: 205 LBS | BODY MASS INDEX: 32.11 KG/M2

## 2025-02-28 DIAGNOSIS — G35 MULTIPLE SCLEROSIS (MULTI): ICD-10-CM

## 2025-02-28 PROCEDURE — A9575 INJ GADOTERATE MEGLUMI 0.1ML: HCPCS | Performed by: NURSE PRACTITIONER

## 2025-02-28 PROCEDURE — 70553 MRI BRAIN STEM W/O & W/DYE: CPT

## 2025-02-28 PROCEDURE — 2550000001 HC RX 255 CONTRASTS: Performed by: NURSE PRACTITIONER

## 2025-02-28 RX ORDER — GADOTERATE MEGLUMINE 376.9 MG/ML
19 INJECTION INTRAVENOUS
Status: COMPLETED | OUTPATIENT
Start: 2025-02-28 | End: 2025-02-28

## 2025-02-28 RX ADMIN — GADOTERATE MEGLUMINE 19 ML: 376.9 INJECTION INTRAVENOUS at 11:52

## 2025-03-03 ASSESSMENT — PROMIS GLOBAL HEALTH SCALE
RATE_QUALITY_OF_LIFE: VERY GOOD
RATE_GENERAL_HEALTH: VERY GOOD
EMOTIONAL_PROBLEMS: OFTEN
RATE_AVERAGE_FATIGUE: MODERATE
RATE_AVERAGE_PAIN: 0
RATE_PHYSICAL_HEALTH: VERY GOOD
CARRYOUT_PHYSICAL_ACTIVITIES: COMPLETELY
CARRYOUT_SOCIAL_ACTIVITIES: VERY GOOD
RATE_SOCIAL_SATISFACTION: VERY GOOD
RATE_MENTAL_HEALTH: GOOD

## 2025-03-10 ENCOUNTER — APPOINTMENT (OUTPATIENT)
Dept: PRIMARY CARE | Facility: CLINIC | Age: 50
End: 2025-03-10
Payer: COMMERCIAL

## 2025-03-10 VITALS
DIASTOLIC BLOOD PRESSURE: 80 MMHG | WEIGHT: 206 LBS | BODY MASS INDEX: 32.33 KG/M2 | HEART RATE: 79 BPM | SYSTOLIC BLOOD PRESSURE: 128 MMHG | HEIGHT: 67 IN

## 2025-03-10 DIAGNOSIS — G89.29 CHRONIC PAIN OF LEFT KNEE: ICD-10-CM

## 2025-03-10 DIAGNOSIS — E55.9 VITAMIN D DEFICIENCY: ICD-10-CM

## 2025-03-10 DIAGNOSIS — G35 MULTIPLE SCLEROSIS (MULTI): ICD-10-CM

## 2025-03-10 DIAGNOSIS — E78.2 MIXED HYPERLIPIDEMIA: Primary | ICD-10-CM

## 2025-03-10 DIAGNOSIS — M25.562 CHRONIC PAIN OF LEFT KNEE: ICD-10-CM

## 2025-03-10 DIAGNOSIS — M54.2 NECK PAIN: ICD-10-CM

## 2025-03-10 DIAGNOSIS — G43.109 MIGRAINE WITH AURA AND WITHOUT STATUS MIGRAINOSUS, NOT INTRACTABLE: ICD-10-CM

## 2025-03-10 DIAGNOSIS — G47.00 INSOMNIA, UNSPECIFIED TYPE: ICD-10-CM

## 2025-03-10 DIAGNOSIS — I10 PRIMARY HYPERTENSION: ICD-10-CM

## 2025-03-10 PROCEDURE — 3074F SYST BP LT 130 MM HG: CPT | Performed by: STUDENT IN AN ORGANIZED HEALTH CARE EDUCATION/TRAINING PROGRAM

## 2025-03-10 PROCEDURE — 1036F TOBACCO NON-USER: CPT | Performed by: STUDENT IN AN ORGANIZED HEALTH CARE EDUCATION/TRAINING PROGRAM

## 2025-03-10 PROCEDURE — 99214 OFFICE O/P EST MOD 30 MIN: CPT | Performed by: STUDENT IN AN ORGANIZED HEALTH CARE EDUCATION/TRAINING PROGRAM

## 2025-03-10 PROCEDURE — 3008F BODY MASS INDEX DOCD: CPT | Performed by: STUDENT IN AN ORGANIZED HEALTH CARE EDUCATION/TRAINING PROGRAM

## 2025-03-10 PROCEDURE — 3079F DIAST BP 80-89 MM HG: CPT | Performed by: STUDENT IN AN ORGANIZED HEALTH CARE EDUCATION/TRAINING PROGRAM

## 2025-03-10 RX ORDER — ERGOCALCIFEROL 1.25 MG/1
1 CAPSULE ORAL
Qty: 12 CAPSULE | Refills: 1 | Status: SHIPPED | OUTPATIENT
Start: 2025-03-10

## 2025-03-10 RX ORDER — TRAZODONE HYDROCHLORIDE 100 MG/1
100 TABLET ORAL NIGHTLY
Qty: 90 TABLET | Refills: 1 | Status: SHIPPED | OUTPATIENT
Start: 2025-03-10

## 2025-03-10 RX ORDER — LISINOPRIL 20 MG/1
20 TABLET ORAL DAILY
Qty: 90 TABLET | Refills: 1 | Status: SHIPPED | OUTPATIENT
Start: 2025-03-10

## 2025-03-10 RX ORDER — NAPROXEN 500 MG/1
500 TABLET ORAL DAILY
Qty: 90 TABLET | Refills: 1 | Status: SHIPPED | OUTPATIENT
Start: 2025-03-10

## 2025-03-10 ASSESSMENT — PATIENT HEALTH QUESTIONNAIRE - PHQ9
2. FEELING DOWN, DEPRESSED OR HOPELESS: NOT AT ALL
SUM OF ALL RESPONSES TO PHQ9 QUESTIONS 1 AND 2: 0
1. LITTLE INTEREST OR PLEASURE IN DOING THINGS: NOT AT ALL

## 2025-03-10 NOTE — PROGRESS NOTES
Subjective   Patient ID: Becky Mendiola is a 49 y.o. female who presents for Annual Exam and Hypertension.  Today she is accompanied by alone.     HPI    1.  Left knee osteoarthritis  Patient has a chronic history of left-sided knee pain as well as arthritis  She even followed up with ortho Dr. Elias in February 2024 where she underwent her third in a series of 3 Euflexxa injections.   She is status post left knee scope lateral release on 6/8/2020  MRI of the knee 11/2023 shows severe patellofemoral compartment cartilage loss as well as mild cartilage loss in the tibiofemoral compartments   There is also a cyst that has been stable from the prior imaging.  Stated that she feels much better after knee injections     2. Benign essential hypertension  Continues to take lisinopril 20 mg daily as indicated in the chart  Blood pressure today 128/80  Denies any cardiopulmonary symptoms  Requesting refills     3. Insomnia  Patient is taking half tablet of trazodone 100 mg, most of the nights.  She is wondering if she can decrease the dose because it made her feel groggy  She is well rested in the morning.   Requesting refills to be sent to her local pharmacy     4. Vitamin D deficiency  Continues to take vitamin D 50,000 International Units weekly.  Patient feels well, without any complaints     5. Migraines  She has been using the Ubrelvy and stated that this works great  Willing and wishing to continue this medication on as-needed basis  Requesting refills  Denies numbness or tingling, dysphagia, weakness, loss of eye sight.    6. Multiple Sclerosis complicated with Right Optic nerve neuritis  Follows with yearly brain MRI that showed similar mild degree of white matter lesions compatible with the patient's history of multiple sclerosis. No new hyperintense lesion.   She has impaired right eye vision consistent with optic neuritis in January 2024. She saw Dr. Steward at that time  She is recovering, but the ultimate  outcome is not yet known as recovery continue for months, perhaps even up to a year, stated that she is close to be at her baseline  Discussion for disease modifying therapy was held at that time, and she was started on Ofatumumab 20 mg q 4 weeks.    7.  Neck pain/ right shoulder pain  Patient continues to have neck and right shoulder pain with marked restricted range of motion, which is interfering with her driving abilities, and ADLs.  Currently she is on gabapentin, naproxen, and doing in-home several exercises.  States that Flexeril is not helping.  Describes neck pain as constant, very painful, shooting and throbbing, it is worse on movement. Does not recall any injury.    She states that she has shooting pain going down her arm.  She has been unable to sleep due to positioning.    She says the pain is worse when she is extending her neck, rotating her head to the right side bending to the right.    8.  Obesity  Patient is here today to discuss about her recent weight gain.  She states that due to her MS, knee OA and recurrent shoulder and neck pain she is unable to do extensive work out.  Also admitted that being her menopausal is not helping with losing weight.  Was attempted to start Wegovy in 8/2024 but was not covered by her insurance    9.  Mixed hyperlipidemia  Most recent lipid panel showed Cholesterol did increase now up to 212, HDL 55, , triglycerides 120 .  ASCVD risk score is 1.8%  Patient denies any cardio-vascular symptoms  CT cardiac calcium score was elevated at 79.35 on RCA      Current Outpatient Medications on File Prior to Visit   Medication Sig Dispense Refill    albuterol (ProAir HFA) 90 mcg/actuation inhaler Inhale 2 puffs every 4 hours if needed for wheezing or shortness of breath. 8.5 g 1    estradiol (Vagifem) 10 mcg tablet vaginal tablet Insert 1 tablet (10 mcg) into the vagina 2 times a week. Insert 1 tablet into vagina at bedtime for 2 weeks, then at bedtime twice a week. 18  "tablet 3    gabapentin (Neurontin) 300 mg capsule Take 1 capsule (300 mg) by mouth 3 times a day. 30 capsule 1    ofatumumab (Kesimpta Pen) 20 mg/0.4 mL injection Inject 0.4 mL (20 mg) under the skin every 28 (twenty-eight) days. 0.4 mL 5    [DISCONTINUED] azelastine (Astelin) 137 mcg (0.1 %) nasal spray Administer 1 spray into each nostril 2 times a day. 30 mL 2    [DISCONTINUED] cyclobenzaprine (Flexeril) 5 mg tablet Take 1 tablet (5 mg) by mouth 3 times a day. 30 tablet 0    [DISCONTINUED] ergocalciferol (Vitamin D-2) 1.25 MG (11539 UT) capsule Take 1 capsule (1,250 mcg) by mouth 1 (one) time per week. 12 capsule 1    [DISCONTINUED] fluticasone (Flonase) 50 mcg/actuation nasal spray Administer 1 spray into each nostril 2 times a day. 16 g 1    [DISCONTINUED] lisinopril 20 mg tablet Take 1 tablet (20 mg) by mouth once daily. 90 tablet 1    [DISCONTINUED] naproxen (Naprosyn) 500 mg tablet TAKE 1 TABLET (500 MG) BY MOUTH 2 TIMES A DAY AS NEEDED FOR MILD PAIN (1 - 3 ON SCALE) 60 tablet 0    [DISCONTINUED] traZODone (Desyrel) 100 mg tablet TAKE 1 TABLET (100 MG) BY MOUTH ONCE DAILY AT BEDTIME. 30 tablet 0    [DISCONTINUED] ubrogepant 50 mg tablet Take 1 tablet (50 mg) by mouth if needed (Migraines) for up to 1 dose. 15 tablet 3     No current facility-administered medications on file prior to visit.        Allergies   Allergen Reactions    Amoxicillin Unknown    Sulfa (Sulfonamide Antibiotics) Unknown       Immunization History   Administered Date(s) Administered    Influenza, seasonal, injectable 10/12/2020    Tdap vaccine, age 7 year and older (BOOSTRIX, ADACEL) 08/01/2017, 01/10/2023         Review of Systems  All pertinent positive symptoms are included in the history of present illness.  All other systems have been reviewed and are negative and noncontributory to this patient's current ailments.     Objective   /80   Pulse 79   Ht 1.702 m (5' 7\")   Wt 93.4 kg (206 lb)   LMP 10/17/2024   BMI 32.26 kg/m² "   BSA: 2.1 meters squared  Office Visit on 02/21/2025   Component Date Value Ref Range Status    POC Glucose, Urine 02/21/2025 NEGATIVE  NEGATIVE mg/dl Final    POC Ketones, Urine 02/21/2025 NEGATIVE  NEGATIVE mg/dl Final    POC Blood, Urine 02/21/2025 NEGATIVE  NEGATIVE Final    POC Protein, Urine 02/21/2025 NEGATIVE  NEGATIVE mg/dl Final    Poc Nitrite, Urine 02/21/2025 NEGATIVE  NEGATIVE Final    POC Leukocytes, Urine 02/21/2025 NEGATIVE  NEGATIVE Final       Physical Exam  Constitutional:       General: She is not in acute distress.     Appearance: Normal appearance. She is normal weight. She is not ill-appearing.   HENT:      Head: Normocephalic and atraumatic.      Right Ear: External ear normal.      Left Ear: External ear normal.      Nose: Nose normal. No congestion or rhinorrhea.      Mouth/Throat:      Mouth: Mucous membranes are moist.      Pharynx: Oropharynx is clear. No oropharyngeal exudate or posterior oropharyngeal erythema.   Eyes:      General: No scleral icterus.     Extraocular Movements: Extraocular movements intact.      Conjunctiva/sclera: Conjunctivae normal.      Pupils: Pupils are equal, round, and reactive to light.   Cardiovascular:      Rate and Rhythm: Normal rate and regular rhythm.      Pulses: Normal pulses.      Heart sounds: Normal heart sounds. No murmur heard.  Pulmonary:      Effort: Pulmonary effort is normal. No respiratory distress.      Breath sounds: Normal breath sounds. No wheezing, rhonchi or rales.   Abdominal:      General: Abdomen is flat. Bowel sounds are normal.      Palpations: Abdomen is soft.      Tenderness: There is no abdominal tenderness. There is no guarding or rebound.   Musculoskeletal:         General: No tenderness or deformity. Normal range of motion.      Cervical back: Rigidity present.      Right lower leg: No edema.      Left lower leg: No edema.   Skin:     General: Skin is warm and dry.      Capillary Refill: Capillary refill takes less than 2  seconds.      Findings: No lesion or rash.   Neurological:      General: No focal deficit present.      Mental Status: She is alert and oriented to person, place, and time. Mental status is at baseline.   Psychiatric:         Mood and Affect: Mood normal.         Behavior: Behavior normal.           Assessment/Plan     1.  Left knee pain  Stable.  Status post 3 cycles of 20 mg sodium hyaluronate, tolerated well  Discussed about possibility of exercising with stationary bike, as a way to not put much pressure on her knees     2. Benign essential hypertension  Stable, continue lisinopril 20 mg daily.  Blood pressure goal should be below 130/80, ideally 120/80  Please check your blood pressure at home and keep a log.  Please bring this log with you to your next appointment     3. Insomnia  Stable, continue trazodone 100 mg nightly, but can decrease to 50 mg nightly as needed.  New prescription was sent to your pharmacy     4. Vitamin D deficiency  Refills of vitamin D supplements were sent to the local pharmacy     5. Migraines  Refills of the Ubrelvy 50 mg were sent to your local pharmacy  Please continue keeping us in the loop on how you are feeling  Stable without any changes     6.  Multiple Sclerosis complicated with Right optic nerve neuritis  Continue to use Kesimpta Pen q 4 weeks and follow regularly with Neuro-Ophtalmology  Situation is improving close to baseline.    7.  Neck pain/right shoulder pain  Situation has not improved despite being treated with NSAID, gabapentin, Flexeril, and in-home exercises.  Clinical presentation and physical exam more consistent with supraspinatus tear  Referral for physical therapy was provided today.  Will continue to monitor closely, considering referral to Ortho if symptoms do not resolve.    8.  Obesity/weight gain  Discussed in length about recent weight gain, and different causes.  Obvious being not able to increase physical activity, as well as being on menopause are  not helping in this case.  We strongly encourage, and discussed using a stationary bike to increase physical activity, and following a low calorie diet for weight loss.    9.  Mixed hyperlipidemia  ASCVD risk score is reassuring for the moment.  Patient does not meet criteria to start any statins.  Educated about importance to follow a low-fat diet  Discussed about CT cardiac calcium score results, and educated about red flags of ischemic heart disease    Thank you for letting us be a part of your care team.  Please call the office if you have further questions or concerns regarding your care    Otherwise, please follow-up in 6 months for continued care and refills as well as your yearly physical examination    Ines Moore MD  [id number], FM Resident

## 2025-03-17 DIAGNOSIS — N95.1 MENOPAUSAL SYMPTOMS: ICD-10-CM

## 2025-03-20 RX ORDER — ESTRADIOL 10 UG/1
TABLET VAGINAL
Qty: 54 TABLET | Refills: 2 | Status: SHIPPED | OUTPATIENT
Start: 2025-03-20

## 2025-06-26 ENCOUNTER — OFFICE VISIT (OUTPATIENT)
Dept: NEUROLOGY | Facility: CLINIC | Age: 50
End: 2025-06-26
Payer: COMMERCIAL

## 2025-06-26 VITALS
WEIGHT: 212 LBS | DIASTOLIC BLOOD PRESSURE: 62 MMHG | HEART RATE: 96 BPM | SYSTOLIC BLOOD PRESSURE: 103 MMHG | BODY MASS INDEX: 33.2 KG/M2 | RESPIRATION RATE: 18 BRPM

## 2025-06-26 DIAGNOSIS — G35 MULTIPLE SCLEROSIS (MULTI): Primary | ICD-10-CM

## 2025-06-26 PROCEDURE — 3074F SYST BP LT 130 MM HG: CPT | Performed by: NURSE PRACTITIONER

## 2025-06-26 PROCEDURE — 99214 OFFICE O/P EST MOD 30 MIN: CPT | Performed by: NURSE PRACTITIONER

## 2025-06-26 PROCEDURE — 3078F DIAST BP <80 MM HG: CPT | Performed by: NURSE PRACTITIONER

## 2025-06-26 ASSESSMENT — PAIN SCALES - GENERAL: PAINLEVEL_OUTOF10: 0-NO PAIN

## 2025-06-26 NOTE — PROGRESS NOTES
Patient name: Becky Mendiola  Onset: ?2008  Diagnosis of MS: 2008 by MRI and LP (Dr Hough)  Disease course at onset: RR  Current disease course: RR  Previous disease therapies:   - Copaxone briefly  - Rebif stopped in 2017 because she ran out  Current disease therapies: none  Most recent MRI brain: 1/2019 stable  Most recent MRI cervical spine: 8/2016, mild consistent with MS stable vs 2014  Most recent MRI thoracic spine: 8/2016, ?T9 cord lesion  CSF: remote reportedly pos for OCB  JCV serology and date: none    Subjective   Becky Mendiola is a 49 y.o. right-handed female here for a follow +  up of her MS, she takes Kesimpta and tolerating well. Last MRI of the brain February 2025 and stable. She reports no new MS symptoms.    ROS  No bowel or bladder issues, stable gait, finished ATB for UTI, reports issues swallowing     Objective   Neurological Exam  Mental Status  Alert. Oriented to person, place, time and situation. Oriented to person, place, and time. Speech is normal. Language is fluent with no aphasia. Attention and concentration are normal.    Cranial Nerves  CN I: Sense of smell is normal.  CN II: Right visual acuity: Finger movement. Left visual acuity: Finger movement. Right normal visual field. Left normal visual field. Right funduscopic exam: disc intact. Left funduscopic exam: disc intact.  CN III, IV, VI: Extraocular movements intact bilaterally. Normal lids and orbits bilaterally. Pupils equal round and reactive to light bilaterally.  CN V: Facial sensation is normal.  CN VII: Full and symmetric facial movement.  CN VIII: Hearing is normal.  CN IX, X: Palate elevates symmetrically  CN XI: Shoulder shrug strength is normal.  CN XII: Tongue midline without atrophy or fasciculations.    Motor  Normal muscle bulk throughout. Normal muscle tone. Strength is 5/5 throughout all four extremities.    Sensory  Light touch is normal in upper and lower extremities.     Reflexes                                             Right                      Left  Brachioradialis                    2+                         2+  Biceps                                 2+                         2+  Triceps                                2+                         2+  Patellar                                2+                         2+   Right palmar grasp present. Left palmar grasp present.    Coordination    Finger-to-nose, rapid alternating movements and heel-to-shin normal bilaterally without dysmetria.  9 hole peg test: Right hand 21.2 sec. Left hand 20.2 sec..    Gait  Normal casual, toe, heel and tandem gait. Timed get up and go test: 6 seconds.  Physical Exam  Constitutional:       Appearance: Normal appearance.   HENT:      Head: Normocephalic.      Right Ear: Tympanic membrane normal.      Left Ear: Tympanic membrane normal.      Nose: Nose normal.      Mouth/Throat:      Mouth: Mucous membranes are moist.   Eyes:      General: Lids are normal.      Extraocular Movements: Extraocular movements intact.      Pupils: Pupils are equal, round, and reactive to light.   Pulmonary:      Effort: Pulmonary effort is normal.   Musculoskeletal:         General: Normal range of motion.      Cervical back: Full passive range of motion without pain and normal range of motion.   Skin:     General: Skin is warm and dry.   Neurological:      Mental Status: She is alert and oriented to person, place, and time.      Motor: Motor strength is normal.     Coordination: Coordination is intact.      Deep Tendon Reflexes:      Reflex Scores:       Tricep reflexes are 2+ on the right side and 2+ on the left side.       Bicep reflexes are 2+ on the right side and 2+ on the left side.       Brachioradialis reflexes are 2+ on the right side and 2+ on the left side.       Patellar reflexes are 2+ on the right side and 2+ on the left side.  Psychiatric:         Attention and Perception: Attention and perception normal.         Mood and Affect: Mood normal.          Speech: Speech normal.         Behavior: Behavior normal. Behavior is cooperative.         Thought Content: Thought content normal.         Cognition and Memory: Cognition normal.         Judgment: Judgment normal.     Provider Impression  Becky Mendiola is a 49 y.o. right-handed female here for a follow +  up of her MS, she takes Kesimpta and tolerating well. Last MRI of the brain February 2025 and stable.    We discussed the importance of living healthy life style with diet and exercise and the importance of V-D in patient's with MS.    The total face to face appointment was 35 minutes and more than 50% of the visit was spent counseling and coordination of care.    I personally reviewed laboratory, radiographic, and medical studies which were pertinent for today's visit.    Plan  - Continue Kesimpta.  - Labs today.   - MRI February 2025.

## 2025-06-27 LAB
ALBUMIN SERPL-MCNC: 4.6 G/DL (ref 3.6–5.1)
ALBUMIN/GLOB SERPL: 1.9 (CALC) (ref 1–2.5)
ALP SERPL-CCNC: 103 U/L (ref 37–153)
ALT SERPL-CCNC: 24 U/L (ref 6–29)
AST SERPL-CCNC: 18 U/L (ref 10–35)
BASOPHILS # BLD AUTO: 40 CELLS/UL (ref 0–200)
BASOPHILS NFR BLD AUTO: 0.4 %
BILIRUB DIRECT SERPL-MCNC: 0.1 MG/DL
BILIRUB INDIRECT SERPL-MCNC: 0.5 MG/DL (CALC) (ref 0.2–1.2)
BILIRUB SERPL-MCNC: 0.6 MG/DL (ref 0.2–1.2)
EOSINOPHIL # BLD AUTO: 220 CELLS/UL (ref 15–500)
EOSINOPHIL NFR BLD AUTO: 2.2 %
ERYTHROCYTE [DISTWIDTH] IN BLOOD BY AUTOMATED COUNT: 12.9 % (ref 11–15)
GLOBULIN SER CALC-MCNC: 2.4 G/DL (CALC) (ref 1.9–3.7)
HCT VFR BLD AUTO: 45.5 % (ref 35–45)
HGB BLD-MCNC: 14.8 G/DL (ref 11.7–15.5)
IGA SERPL-MCNC: 210 MG/DL (ref 47–310)
IGG SERPL-MCNC: 910 MG/DL (ref 600–1640)
IGM SERPL-MCNC: 100 MG/DL (ref 50–300)
LYMPHOCYTES # BLD AUTO: 2000 CELLS/UL (ref 850–3900)
LYMPHOCYTES NFR BLD AUTO: 20 %
MCH RBC QN AUTO: 31.5 PG (ref 27–33)
MCHC RBC AUTO-ENTMCNC: 32.5 G/DL (ref 32–36)
MCV RBC AUTO: 96.8 FL (ref 80–100)
MONOCYTES # BLD AUTO: 800 CELLS/UL (ref 200–950)
MONOCYTES NFR BLD AUTO: 8 %
NEUTROPHILS # BLD AUTO: 6940 CELLS/UL (ref 1500–7800)
NEUTROPHILS NFR BLD AUTO: 69.4 %
PLATELET # BLD AUTO: 280 THOUSAND/UL (ref 140–400)
PMV BLD REES-ECKER: 10.6 FL (ref 7.5–12.5)
PROT SERPL-MCNC: 7 G/DL (ref 6.1–8.1)
RBC # BLD AUTO: 4.7 MILLION/UL (ref 3.8–5.1)
WBC # BLD AUTO: 10 THOUSAND/UL (ref 3.8–10.8)

## 2025-08-27 DIAGNOSIS — E55.9 VITAMIN D DEFICIENCY: ICD-10-CM

## 2025-08-27 RX ORDER — ERGOCALCIFEROL 1.25 MG/1
1.25 CAPSULE ORAL
Qty: 12 CAPSULE | Refills: 1 | OUTPATIENT
Start: 2025-08-27

## 2026-02-23 ENCOUNTER — APPOINTMENT (OUTPATIENT)
Dept: OBSTETRICS AND GYNECOLOGY | Facility: CLINIC | Age: 51
End: 2026-02-23
Payer: COMMERCIAL